# Patient Record
Sex: MALE | Race: WHITE | Employment: FULL TIME | URBAN - METROPOLITAN AREA
[De-identification: names, ages, dates, MRNs, and addresses within clinical notes are randomized per-mention and may not be internally consistent; named-entity substitution may affect disease eponyms.]

---

## 2020-10-15 ENCOUNTER — HOSPITAL ENCOUNTER (OUTPATIENT)
Dept: RADIOLOGY | Facility: HOSPITAL | Age: 45
Discharge: HOME | End: 2020-10-15
Attending: INTERNAL MEDICINE
Payer: COMMERCIAL

## 2022-09-20 ENCOUNTER — TELEPHONE (OUTPATIENT)
Dept: FAMILY MEDICINE | Facility: CLINIC | Age: 47
End: 2022-09-20
Payer: COMMERCIAL

## 2022-09-20 NOTE — TELEPHONE ENCOUNTER
----- Message from Hector Vazquez sent at 9/20/2022  1:35 PM CDT -----  Regarding: pt called  Name of Who is Calling: BREANNA MAZARIEGOS [[id number]]      What is the request in detail: pt called requestin appt for hisself and his wife      Can the clinic reply by [facility]: no      What Number to Call Back if not in [facility]:[phone] (home)

## 2022-09-20 NOTE — TELEPHONE ENCOUNTER
Called and spoke with pt wife. Wife informed that she has already scheduled pt an appt and no longer needs an appt.

## 2022-10-10 ENCOUNTER — OFFICE VISIT (OUTPATIENT)
Dept: FAMILY MEDICINE | Facility: CLINIC | Age: 47
End: 2022-10-10
Payer: COMMERCIAL

## 2022-10-10 VITALS
BODY MASS INDEX: 43.64 KG/M2 | HEIGHT: 69 IN | SYSTOLIC BLOOD PRESSURE: 132 MMHG | HEART RATE: 93 BPM | DIASTOLIC BLOOD PRESSURE: 72 MMHG | OXYGEN SATURATION: 96 % | TEMPERATURE: 98 F | WEIGHT: 294.63 LBS

## 2022-10-10 DIAGNOSIS — Z12.11 SPECIAL SCREENING FOR MALIGNANT NEOPLASMS, COLON: ICD-10-CM

## 2022-10-10 DIAGNOSIS — Z76.89 ESTABLISHING CARE WITH NEW DOCTOR, ENCOUNTER FOR: Primary | ICD-10-CM

## 2022-10-10 DIAGNOSIS — I89.0 LYMPHEDEMA: ICD-10-CM

## 2022-10-10 DIAGNOSIS — R73.01 IFG (IMPAIRED FASTING GLUCOSE): ICD-10-CM

## 2022-10-10 DIAGNOSIS — E29.1 HYPOGONADISM IN MALE: ICD-10-CM

## 2022-10-10 DIAGNOSIS — M79.7 FIBROMYALGIA: Primary | ICD-10-CM

## 2022-10-10 DIAGNOSIS — I10 ESSENTIAL HYPERTENSION: ICD-10-CM

## 2022-10-10 DIAGNOSIS — Z23 NEED FOR INFLUENZA VACCINATION: ICD-10-CM

## 2022-10-10 DIAGNOSIS — G56.00 CARPAL TUNNEL SYNDROME, UNSPECIFIED LATERALITY: ICD-10-CM

## 2022-10-10 DIAGNOSIS — F50.81 BINGE EATING DISORDER: ICD-10-CM

## 2022-10-10 DIAGNOSIS — J30.9 ALLERGIC SINUSITIS: ICD-10-CM

## 2022-10-10 DIAGNOSIS — M79.7 FIBROMYALGIA: ICD-10-CM

## 2022-10-10 DIAGNOSIS — Z12.5 PROSTATE CANCER SCREENING: ICD-10-CM

## 2022-10-10 PROCEDURE — 1159F PR MEDICATION LIST DOCUMENTED IN MEDICAL RECORD: ICD-10-PCS | Mod: CPTII,S$GLB,, | Performed by: PHYSICIAN ASSISTANT

## 2022-10-10 PROCEDURE — 3075F SYST BP GE 130 - 139MM HG: CPT | Mod: CPTII,S$GLB,, | Performed by: PHYSICIAN ASSISTANT

## 2022-10-10 PROCEDURE — 3008F BODY MASS INDEX DOCD: CPT | Mod: CPTII,S$GLB,, | Performed by: PHYSICIAN ASSISTANT

## 2022-10-10 PROCEDURE — 1160F PR REVIEW ALL MEDS BY PRESCRIBER/CLIN PHARMACIST DOCUMENTED: ICD-10-PCS | Mod: CPTII,S$GLB,, | Performed by: PHYSICIAN ASSISTANT

## 2022-10-10 PROCEDURE — 4010F ACE/ARB THERAPY RXD/TAKEN: CPT | Mod: CPTII,S$GLB,, | Performed by: PHYSICIAN ASSISTANT

## 2022-10-10 PROCEDURE — 3075F PR MOST RECENT SYSTOLIC BLOOD PRESS GE 130-139MM HG: ICD-10-PCS | Mod: CPTII,S$GLB,, | Performed by: PHYSICIAN ASSISTANT

## 2022-10-10 PROCEDURE — 4010F PR ACE/ARB THEARPY RXD/TAKEN: ICD-10-PCS | Mod: CPTII,S$GLB,, | Performed by: PHYSICIAN ASSISTANT

## 2022-10-10 PROCEDURE — 99205 OFFICE O/P NEW HI 60 MIN: CPT | Mod: S$GLB,,, | Performed by: PHYSICIAN ASSISTANT

## 2022-10-10 PROCEDURE — 3078F DIAST BP <80 MM HG: CPT | Mod: CPTII,S$GLB,, | Performed by: PHYSICIAN ASSISTANT

## 2022-10-10 PROCEDURE — 3078F PR MOST RECENT DIASTOLIC BLOOD PRESSURE < 80 MM HG: ICD-10-PCS | Mod: CPTII,S$GLB,, | Performed by: PHYSICIAN ASSISTANT

## 2022-10-10 PROCEDURE — 99205 PR OFFICE/OUTPT VISIT, NEW, LEVL V, 60-74 MIN: ICD-10-PCS | Mod: S$GLB,,, | Performed by: PHYSICIAN ASSISTANT

## 2022-10-10 PROCEDURE — 3008F PR BODY MASS INDEX (BMI) DOCUMENTED: ICD-10-PCS | Mod: CPTII,S$GLB,, | Performed by: PHYSICIAN ASSISTANT

## 2022-10-10 PROCEDURE — 1159F MED LIST DOCD IN RCRD: CPT | Mod: CPTII,S$GLB,, | Performed by: PHYSICIAN ASSISTANT

## 2022-10-10 PROCEDURE — 1160F RVW MEDS BY RX/DR IN RCRD: CPT | Mod: CPTII,S$GLB,, | Performed by: PHYSICIAN ASSISTANT

## 2022-10-10 RX ORDER — IBUPROFEN 800 MG/1
800 TABLET ORAL 3 TIMES DAILY
Qty: 90 TABLET | Refills: 0 | Status: CANCELLED | OUTPATIENT
Start: 2022-10-10 | End: 2022-11-09

## 2022-10-10 RX ORDER — LORATADINE 10 MG/1
10 TABLET ORAL DAILY
Qty: 90 TABLET | Refills: 1 | Status: SHIPPED | OUTPATIENT
Start: 2022-10-10 | End: 2022-11-09

## 2022-10-10 RX ORDER — LISDEXAMFETAMINE DIMESYLATE 50 MG/1
50 CAPSULE ORAL EVERY MORNING
COMMUNITY
End: 2022-10-10 | Stop reason: SDUPTHER

## 2022-10-10 RX ORDER — HYDROCHLOROTHIAZIDE 12.5 MG/1
12.5 TABLET ORAL DAILY
Qty: 90 TABLET | Refills: 1 | Status: SHIPPED | OUTPATIENT
Start: 2022-10-10 | End: 2023-04-12 | Stop reason: SDUPTHER

## 2022-10-10 RX ORDER — IBUPROFEN 800 MG/1
800 TABLET ORAL 3 TIMES DAILY
COMMUNITY
End: 2022-10-10

## 2022-10-10 RX ORDER — IRBESARTAN 300 MG/1
300 TABLET ORAL NIGHTLY
COMMUNITY
End: 2022-10-10 | Stop reason: SDUPTHER

## 2022-10-10 RX ORDER — PREGABALIN 75 MG/1
75 CAPSULE ORAL 2 TIMES DAILY
Qty: 60 CAPSULE | Refills: 0 | Status: CANCELLED | OUTPATIENT
Start: 2022-10-10 | End: 2022-11-09

## 2022-10-10 RX ORDER — LORATADINE 10 MG/1
10 TABLET ORAL DAILY
COMMUNITY
End: 2022-10-10 | Stop reason: SDUPTHER

## 2022-10-10 RX ORDER — DULOXETIN HYDROCHLORIDE 30 MG/1
30 CAPSULE, DELAYED RELEASE ORAL DAILY
Qty: 90 CAPSULE | Refills: 1 | Status: SHIPPED | OUTPATIENT
Start: 2022-10-10 | End: 2023-04-12 | Stop reason: SDUPTHER

## 2022-10-10 RX ORDER — PREGABALIN 75 MG/1
75 CAPSULE ORAL 2 TIMES DAILY
Qty: 60 CAPSULE | Refills: 0 | Status: SHIPPED | OUTPATIENT
Start: 2022-10-10 | End: 2023-01-10

## 2022-10-10 RX ORDER — CETIRIZINE HYDROCHLORIDE 10 MG/1
10 TABLET ORAL DAILY
Qty: 30 TABLET | Refills: 0 | Status: CANCELLED | OUTPATIENT
Start: 2022-10-10 | End: 2022-11-09

## 2022-10-10 RX ORDER — PREGABALIN 75 MG/1
75 CAPSULE ORAL 2 TIMES DAILY
COMMUNITY
End: 2022-10-10 | Stop reason: SDUPTHER

## 2022-10-10 RX ORDER — FENOFIBRATE 160 MG/1
145 TABLET ORAL DAILY
COMMUNITY
End: 2023-07-27

## 2022-10-10 RX ORDER — IRBESARTAN 300 MG/1
300 TABLET ORAL NIGHTLY
Qty: 90 TABLET | Refills: 1 | Status: SHIPPED | OUTPATIENT
Start: 2022-10-10 | End: 2022-10-28 | Stop reason: SDUPTHER

## 2022-10-10 RX ORDER — LISDEXAMFETAMINE DIMESYLATE 50 MG/1
50 CAPSULE ORAL EVERY MORNING
Qty: 30 CAPSULE | Refills: 0 | Status: SHIPPED | OUTPATIENT
Start: 2022-12-08 | End: 2022-12-07 | Stop reason: CLARIF

## 2022-10-10 RX ORDER — LISDEXAMFETAMINE DIMESYLATE 50 MG/1
50 CAPSULE ORAL EVERY MORNING
Qty: 30 CAPSULE | Refills: 0 | Status: SHIPPED | OUTPATIENT
Start: 2022-10-10 | End: 2022-10-10 | Stop reason: SDUPTHER

## 2022-10-10 RX ORDER — LISDEXAMFETAMINE DIMESYLATE 50 MG/1
50 CAPSULE ORAL EVERY MORNING
Qty: 30 CAPSULE | Refills: 0 | Status: SHIPPED | OUTPATIENT
Start: 2022-11-09 | End: 2022-12-07 | Stop reason: CLARIF

## 2022-10-10 RX ORDER — CELECOXIB 200 MG/1
200 CAPSULE ORAL DAILY
Qty: 90 CAPSULE | Refills: 1 | Status: SHIPPED | OUTPATIENT
Start: 2022-10-10 | End: 2023-04-12 | Stop reason: SDUPTHER

## 2022-10-10 RX ORDER — CETIRIZINE HYDROCHLORIDE 10 MG/1
10 TABLET ORAL DAILY
COMMUNITY
End: 2022-10-10

## 2022-10-10 NOTE — TELEPHONE ENCOUNTER
New patient and we never filled this medication before.  Last visit: 10/10/22  Next visit: 1/10/23  Last dispense: 7/20/22

## 2022-10-10 NOTE — PROGRESS NOTES
SUBJECTIVE:    Patient ID: Flaco Magaña is a 46 y.o. male.    Chief Complaint: Establish Care (No bottles but has med list/ Pt is here to establish care and discuss buring in left leg, weight loss/Decline flu/BG)    Pt is a 46 y.o. male who presents today as a new patient to establish care.  He presents today with his wife, who contributed to the majority of the interview. He is a poor medical historian. He is a previous patient of Deana Browning NP.  His past medical, surgical, social, and family history was reviewed and documented in his chart.  He has a history of binge eating disorder, currently managed on Vyvanse 50 mg q.d..  He does not follow any particular diet and averages 3-4 meals/day.  He currently works as a  at [employer].  He does not participate in any daily exercise and the majority of his calorie expenditure is via work. He is a former smoker, averaging 1 ppd for 21 years.  He rarely drinks alcohol and denies any recreational drug use.  He follows up with Dr. Lino (Pain Management) regarding his chronic neck pain s/p DORENE.  He currently manages his pain with p.r.n. Ibuprofen 800 mg, but states it is not as efficacious as in the past.  He is requesting an alternative medication today - states he attempted Mobic in the past and it was not efficacious.  He is scheduled to undergo a right carpal tunnel release with Dr. Filipe Dupree (Ortho in [place], [place]) this coming Friday.  He is requesting a external referral for PT/OT therapy postprocedure.  He has a family history of colon cancer - father diagnosed at age 57.  He had a colonoscopy performed in 09/2016 with Dr. Deluca - [facility] 3-5 years, but never followed up with his repeat.  He denies any GI complaints today.    Today, he presents with multiple complaints - weight gain, lower extremity edema, diffuse joint pains, and a numbness sensation in the proximal left thigh.     Never had PSA level obtained in the past.  Denies any  family history of prostate cancer.  Denies any nocturia or LUTS today.       Patient reports having history of low testosterone and receives Bio-T pellets managed by his previous PCP.  He is requesting a referral to establish with a new provider to receive his pellet placements.    Due for influenza vaccine.    No visits with results within 6 Month(s) from this visit.   Latest known visit with results is:   No results found for any previous visit.       Past Medical History:   Diagnosis Date    Binge eating disorder     Hypertension     Hypertriglyceridemia     Other seasonal allergic rhinitis     Unspecified osteoarthritis, unspecified site      Social History     Socioeconomic History    Marital status:    Occupational History    Occupation: [employer]   Tobacco Use    Smoking status: Former     Packs/day: 1.00     Types: Cigarettes    Smokeless tobacco: Never   Substance and Sexual Activity    Alcohol use: No    Drug use: Not Currently   Social History Narrative    ** Merged History Encounter **          Past Surgical History:   Procedure Laterality Date    COLONOSCOPY N/A 9/2/2016    Procedure: COLONOSCOPY;  Surgeon: Yosi Bueno MD;  Location: [facility];  Service: Endoscopy;  Laterality: N/A;     Family History   Problem Relation Age of Onset    Hypertension Mother     Cancer Father         colon    Hypertension Brother     Gout Brother        Review of patient's allergies indicates:  No Known Allergies    Current Outpatient Medications:     fenofibrate 160 MG Tab, Take 145 mg by mouth once daily., Disp: , Rfl:     celecoxib (CELEBREX) 200 MG capsule, Take 1 capsule (200 mg total) by mouth once daily., Disp: 90 capsule, Rfl: 1    DULoxetine (CYMBALTA) 30 MG capsule, Take 1 capsule (30 mg total) by mouth once daily., Disp: 90 capsule, Rfl: 1    hydroCHLOROthiazide (HYDRODIURIL) 12.5 MG Tab, Take 1 tablet (12.5 mg total) by mouth once daily., Disp: 90 tablet, Rfl: 1    irbesartan (AVAPRO) 300 MG  "tablet, Take 1 tablet (300 mg total) by mouth every evening., Disp: 90 tablet, Rfl: 1    [START ON 12/8/2022] lisdexamfetamine (VYVANSE) 50 MG capsule, Take 1 capsule (50 mg total) by mouth every morning., Disp: 30 capsule, Rfl: 0    [START ON 11/9/2022] lisdexamfetamine (VYVANSE) 50 MG capsule, Take 1 capsule (50 mg total) by mouth every morning., Disp: 30 capsule, Rfl: 0    loratadine (CLARITIN) 10 mg tablet, Take 1 tablet (10 mg total) by mouth once daily., Disp: 90 tablet, Rfl: 1    pregabalin (LYRICA) 75 MG capsule, Take 1 capsule (75 mg total) by mouth 2 (two) times daily., Disp: 60 capsule, Rfl: 0    Review of Systems   Constitutional:  Positive for unexpected weight change. Negative for activity change, appetite change, chills, fatigue and fever.   Respiratory:  Negative for cough, shortness of breath and wheezing.    Cardiovascular:  Positive for leg swelling. Negative for chest pain and palpitations.   Gastrointestinal:  Negative for abdominal pain, constipation, diarrhea, nausea and vomiting.   Genitourinary:  Negative for decreased urine volume, difficulty urinating, dysuria, frequency, hematuria and urgency.   Musculoskeletal:  Positive for arthralgias ("Neck, shoulders, lower back, hips, knees, all over."), back pain and neck pain. Negative for gait problem, myalgias and neck stiffness.   Skin:  Negative for rash.   Neurological:  Positive for numbness ("Only on the top of the left thigh."). Negative for dizziness, syncope, weakness, light-headedness and headaches.   Psychiatric/Behavioral:  Negative for behavioral problems.         Objective:      Vitals:    10/10/22 1402   BP: 132/72   Pulse: 93   Temp: 98.1 °F (36.7 °C)   TempSrc: Oral   SpO2: 96%   Weight: 133.6 kg (294 lb 9.6 oz)   Height: 5' 9" (1.753 m)     Physical Exam  Vitals and nursing note reviewed.   Constitutional:       General: He is not in acute distress.     Appearance: Normal appearance. He is obese. He is not ill-appearing.      " Comments: Morbidly obese WM sitting erect in an office chair in no acute distress.   HENT:      Head: Normocephalic and atraumatic.      Right Ear: External ear normal.      Left Ear: External ear normal.      Nose: Nose normal. No rhinorrhea.      Mouth/Throat:      Mouth: Mucous membranes are moist.      Pharynx: Oropharynx is clear.   Eyes:      General: No scleral icterus.     Conjunctiva/sclera: Conjunctivae normal.   Cardiovascular:      Rate and Rhythm: Normal rate and regular rhythm.      Pulses: Normal pulses.      Heart sounds: Normal heart sounds. No murmur heard.    No friction rub.   Pulmonary:      Effort: Pulmonary effort is normal.      Breath sounds: Normal breath sounds. No wheezing, rhonchi or rales.   Abdominal:      General: There is no distension.      Palpations: Abdomen is soft. There is no mass.      Tenderness: There is no abdominal tenderness. There is no guarding or rebound.      Comments: Large, protuberant belly noted.  Soft and nontender to palpation.   Musculoskeletal:      Cervical back: Normal range of motion. No rigidity or tenderness.      Right lower leg: Edema (trace) present.      Left lower leg: Edema (trace) present.        Legs:       Comments: 90 degree flexion at the waist.  No bony abnormalities or step-offs noted upon palpation of the lumbar spine.  5/5 strength against resistance noted in the bilateral upper and lower extremities.  5/5  strength noted bilaterally.   Skin:     General: Skin is warm and dry.      Coloration: Skin is not jaundiced.      Findings: No abrasion, bruising or rash.             Comments: Red circles ari the areas of patients subjectively reported pain.   Neurological:      General: No focal deficit present.      Mental Status: He is alert and oriented to person, place, and time. Mental status is at baseline.      Cranial Nerves: No cranial nerve deficit.      Motor: No weakness.      Coordination: Coordination normal.      Gait: Gait  normal.   Psychiatric:         Mood and Affect: Mood normal.         Behavior: Behavior normal. Behavior is cooperative.         Assessment:       1. Establishing care with new doctor, encounter for    2. Need for influenza vaccination    3. Special screening for malignant neoplasms, colon    4. Prostate cancer screening    5. Fibromyalgia    6. BMI 40.0-44.9, adult    7. Lymphedema    8. Binge eating disorder    9. Allergic sinusitis    10. Essential hypertension    11. Carpal tunnel syndrome, unspecified laterality    12. Hypogonadism in male    13. IFG (impaired fasting glucose)         Plan:       Establishing care with new doctor, encounter for  Baseline lab work ordered today and to be completed when patient is fasting.    Need for influenza vaccination  Patient was offered, but declined influenza vaccine today.    Special screening for malignant neoplasms, colon  Amb referral sent to Dr. Barnes today for screening colonoscopy  -     Ambulatory referral/consult to Gastroenterology; Future; Expected date: 10/10/2022    Prostate cancer screening  PSA lab work ordered today.    Fibromyalgia  Based on patient's presentation and physical exam findings, strong suspicion for fibromyalgia.  Start Cymbalta 30mg q.d. - will titrate if pain persist.  Start Celebrex 200mg q.d..  -     DULoxetine (CYMBALTA) 30 MG capsule; Take 1 capsule (30 mg total) by mouth once daily.  Dispense: 90 capsule; Refill: 1  -     celecoxib (CELEBREX) 200 MG capsule; Take 1 capsule (200 mg total) by mouth once daily.  Dispense: 90 capsule; Refill: 1    BMI 40.0-44.9, adult    Lymphedema  Start HCTZ 12.5 mg q.d..  Begin following a low-sodium diet, wearing compression stockings daily, and elevate lower extremities above heart level when stationary.  Repeat BMP in 4 weeks to reassess kidney function and electrolyte status after initiating thiazide diuretic.  -     hydroCHLOROthiazide (HYDRODIURIL) 12.5 MG Tab; Take 1 tablet (12.5 mg total) by  mouth once daily.  Dispense: 90 tablet; Refill: 1    Binge eating disorder  Currently managed on Vyvanse 50 mg q.d. - refills sent today.  Will complete Profile 4 UDS on follow-up visit.  -     Discontinue: lisdexamfetamine (VYVANSE) 50 MG capsule; Take 1 capsule (50 mg total) by mouth every morning.  Dispense: 30 capsule; Refill: 0  -     lisdexamfetamine (VYVANSE) 50 MG capsule; Take 1 capsule (50 mg total) by mouth every morning.  Dispense: 30 capsule; Refill: 0  -     lisdexamfetamine (VYVANSE) 50 MG capsule; Take 1 capsule (50 mg total) by mouth every morning.  Dispense: 30 capsule; Refill: 0    Allergic sinusitis  -     loratadine (CLARITIN) 10 mg tablet; Take 1 tablet (10 mg total) by mouth once daily.  Dispense: 90 tablet; Refill: 1    Essential hypertension  BP stable and well controlled.    Continue as is - refill sent today.  -     irbesartan (AVAPRO) 300 MG tablet; Take 1 tablet (300 mg total) by mouth every evening.  Dispense: 90 tablet; Refill: 1    Carpal tunnel syndrome, unspecified laterality  Scheduled to undergo carpal tunnel release surgery with Dr. Filipe Dupree (ortho) on Friday.   Amb ref for PT/OT sent today for post-op rehab - patient requested external referral to have rehab completed in [place].  -     Ambulatory referral/consult to Physical/Occupational Therapy; Future; Expected date: 10/10/2022    Hypogonadism in male  Amb ref sent to Dr. Diaz (GYN) today to establish care and have his Bio-T pellet management.    Follow up in about 3 months (around 1/10/2023).        10/11/2022 Cuauhtemoc Murphy PA-C

## 2022-12-01 DIAGNOSIS — F50.81 BINGE EATING DISORDER: ICD-10-CM

## 2022-12-01 RX ORDER — LISDEXAMFETAMINE DIMESYLATE 50 MG/1
50 CAPSULE ORAL EVERY MORNING
Qty: 30 CAPSULE | Refills: 0 | Status: CANCELLED | OUTPATIENT
Start: 2022-12-01 | End: 2022-12-31

## 2022-12-01 NOTE — TELEPHONE ENCOUNTER
Pt is needing a refill on his Vyvanse. Last office visit 10/10/2022. Next office visit 01/10/2023. Chart shows last dispense 11/09/2022. No UDS on file.     Spoke with pt's spouse Aneta  in regards to medication refill request. Verbalized that a new prescription for the Vyvanse is at the pharmacy, but can not be filled until 12/08/2022. Aneta acknowledge understanding.

## 2022-12-07 ENCOUNTER — LAB VISIT (OUTPATIENT)
Dept: LAB | Facility: HOSPITAL | Age: 47
End: 2022-12-07
Attending: PHYSICIAN ASSISTANT
Payer: COMMERCIAL

## 2022-12-07 ENCOUNTER — HOSPITAL ENCOUNTER (OUTPATIENT)
Dept: PREADMISSION TESTING | Facility: HOSPITAL | Age: 47
Discharge: HOME | End: 2022-12-07
Attending: INTERNAL MEDICINE
Payer: COMMERCIAL

## 2022-12-07 DIAGNOSIS — R73.01 IMPAIRED FASTING GLUCOSE: ICD-10-CM

## 2022-12-07 DIAGNOSIS — E29.1 3-OXO-5 ALPHA-STEROID DELTA 4-DEHYDROGENASE DEFICIENCY: Primary | ICD-10-CM

## 2022-12-07 DIAGNOSIS — I10 ESSENTIAL HYPERTENSION, MALIGNANT: ICD-10-CM

## 2022-12-07 DIAGNOSIS — Z12.5 SPECIAL SCREENING FOR MALIGNANT NEOPLASM OF PROSTATE: ICD-10-CM

## 2022-12-07 DIAGNOSIS — F50.81 RECURRENT BINGE EATING: ICD-10-CM

## 2022-12-07 DIAGNOSIS — Z01.818 PRE-OP TESTING: ICD-10-CM

## 2022-12-07 DIAGNOSIS — Z01.818 PRE-OP TESTING: Primary | ICD-10-CM

## 2022-12-07 LAB
ALBUMIN SERPL BCP-MCNC: 4.5 G/DL (ref 3.5–5.2)
ALBUMIN/CREAT UR: 2.3 UG/MG (ref 0–30)
ALP SERPL-CCNC: 54 U/L (ref 55–135)
ALT SERPL W/O P-5'-P-CCNC: 45 U/L (ref 10–44)
ANION GAP SERPL CALC-SCNC: 8 MMOL/L (ref 8–16)
AST SERPL-CCNC: 23 U/L (ref 10–40)
BASOPHILS # BLD AUTO: 0.09 K/UL (ref 0–0.2)
BASOPHILS NFR BLD: 1.9 % (ref 0–1.9)
BILIRUB SERPL-MCNC: 1.1 MG/DL (ref 0.1–1)
BILIRUB UR QL STRIP: NEGATIVE
BUN SERPL-MCNC: 17 MG/DL (ref 6–20)
CALCIUM SERPL-MCNC: 9.5 MG/DL (ref 8.7–10.5)
CHLORIDE SERPL-SCNC: 99 MMOL/L (ref 95–110)
CHOLEST SERPL-MCNC: 226 MG/DL (ref 120–199)
CHOLEST/HDLC SERPL: 6.1 {RATIO} (ref 2–5)
CLARITY UR: CLEAR
CO2 SERPL-SCNC: 30 MMOL/L (ref 23–29)
COLOR UR: YELLOW
COMPLEXED PSA SERPL-MCNC: 0.53 NG/ML (ref 0–4)
CREAT SERPL-MCNC: 1.1 MG/DL (ref 0.5–1.4)
CREAT UR-MCNC: 111 MG/DL (ref 23–375)
DIFFERENTIAL METHOD: ABNORMAL
EOSINOPHIL # BLD AUTO: 0.1 K/UL (ref 0–0.5)
EOSINOPHIL NFR BLD: 3 % (ref 0–8)
ERYTHROCYTE [DISTWIDTH] IN BLOOD BY AUTOMATED COUNT: 12.3 % (ref 11.5–14.5)
EST. GFR  (NO RACE VARIABLE): >60 ML/MIN/1.73 M^2
ESTIMATED AVG GLUCOSE: 105 MG/DL (ref 68–131)
GLUCOSE SERPL-MCNC: 94 MG/DL (ref 70–110)
GLUCOSE UR QL STRIP: NEGATIVE
HBA1C MFR BLD: 5.3 % (ref 4.5–6.2)
HCT VFR BLD AUTO: 46.1 % (ref 40–54)
HDLC SERPL-MCNC: 37 MG/DL (ref 40–75)
HDLC SERPL: 16.4 % (ref 20–50)
HGB BLD-MCNC: 16 G/DL (ref 14–18)
HGB UR QL STRIP: NEGATIVE
IMM GRANULOCYTES # BLD AUTO: 0.03 K/UL (ref 0–0.04)
IMM GRANULOCYTES NFR BLD AUTO: 0.6 % (ref 0–0.5)
KETONES UR QL STRIP: NEGATIVE
LDLC SERPL CALC-MCNC: 118.8 MG/DL (ref 63–159)
LEUKOCYTE ESTERASE UR QL STRIP: NEGATIVE
LYMPHOCYTES # BLD AUTO: 1.6 K/UL (ref 1–4.8)
LYMPHOCYTES NFR BLD: 33.7 % (ref 18–48)
MCH RBC QN AUTO: 29.4 PG (ref 27–31)
MCHC RBC AUTO-ENTMCNC: 34.7 G/DL (ref 32–36)
MCV RBC AUTO: 85 FL (ref 82–98)
MICROALBUMIN UR DL<=1MG/L-MCNC: 2.5 UG/ML
MONOCYTES # BLD AUTO: 0.4 K/UL (ref 0.3–1)
MONOCYTES NFR BLD: 7.5 % (ref 4–15)
NEUTROPHILS # BLD AUTO: 2.5 K/UL (ref 1.8–7.7)
NEUTROPHILS NFR BLD: 53.3 % (ref 38–73)
NITRITE UR QL STRIP: NEGATIVE
NONHDLC SERPL-MCNC: 189 MG/DL
NRBC BLD-RTO: 0 /100 WBC
PH UR STRIP: 8 [PH] (ref 5–8)
PLATELET # BLD AUTO: 240 K/UL (ref 150–450)
PMV BLD AUTO: 9.8 FL (ref 9.2–12.9)
POTASSIUM SERPL-SCNC: 4.2 MMOL/L (ref 3.5–5.1)
PROT SERPL-MCNC: 7.1 G/DL (ref 6–8.4)
PROT UR QL STRIP: NEGATIVE
RBC # BLD AUTO: 5.45 M/UL (ref 4.6–6.2)
SODIUM SERPL-SCNC: 137 MMOL/L (ref 136–145)
SP GR UR STRIP: 1.01 (ref 1–1.03)
TRIGL SERPL-MCNC: 351 MG/DL (ref 30–150)
TSH SERPL DL<=0.005 MIU/L-ACNC: 2.34 UIU/ML (ref 0.34–5.6)
URN SPEC COLLECT METH UR: ABNORMAL
UROBILINOGEN UR STRIP-ACNC: ABNORMAL EU/DL
WBC # BLD AUTO: 4.66 K/UL (ref 3.9–12.7)

## 2022-12-07 PROCEDURE — 84153 ASSAY OF PSA TOTAL: CPT | Performed by: PHYSICIAN ASSISTANT

## 2022-12-07 PROCEDURE — 93010 EKG 12-LEAD: ICD-10-PCS | Mod: ,,, | Performed by: INTERNAL MEDICINE

## 2022-12-07 PROCEDURE — 81003 URINALYSIS AUTO W/O SCOPE: CPT | Performed by: PHYSICIAN ASSISTANT

## 2022-12-07 PROCEDURE — 85025 COMPLETE CBC W/AUTO DIFF WBC: CPT | Performed by: PHYSICIAN ASSISTANT

## 2022-12-07 PROCEDURE — 80061 LIPID PANEL: CPT | Performed by: PHYSICIAN ASSISTANT

## 2022-12-07 PROCEDURE — 80053 COMPREHEN METABOLIC PANEL: CPT | Performed by: PHYSICIAN ASSISTANT

## 2022-12-07 PROCEDURE — 83036 HEMOGLOBIN GLYCOSYLATED A1C: CPT | Performed by: PHYSICIAN ASSISTANT

## 2022-12-07 PROCEDURE — 82043 UR ALBUMIN QUANTITATIVE: CPT | Performed by: PHYSICIAN ASSISTANT

## 2022-12-07 PROCEDURE — 82570 ASSAY OF URINE CREATININE: CPT | Performed by: PHYSICIAN ASSISTANT

## 2022-12-07 PROCEDURE — 84403 ASSAY OF TOTAL TESTOSTERONE: CPT | Performed by: PHYSICIAN ASSISTANT

## 2022-12-07 PROCEDURE — 84443 ASSAY THYROID STIM HORMONE: CPT | Performed by: PHYSICIAN ASSISTANT

## 2022-12-07 PROCEDURE — 93010 ELECTROCARDIOGRAM REPORT: CPT | Mod: ,,, | Performed by: INTERNAL MEDICINE

## 2022-12-07 PROCEDURE — 93005 ELECTROCARDIOGRAM TRACING: CPT | Performed by: INTERNAL MEDICINE

## 2022-12-08 LAB — TESTOST SERPL-MCNC: 369 NG/DL (ref 264–916)

## 2022-12-12 ENCOUNTER — TELEPHONE (OUTPATIENT)
Dept: FAMILY MEDICINE | Facility: CLINIC | Age: 47
End: 2022-12-12

## 2022-12-12 DIAGNOSIS — E78.5 HYPERLIPIDEMIA, UNSPECIFIED HYPERLIPIDEMIA TYPE: Primary | ICD-10-CM

## 2022-12-12 RX ORDER — ATORVASTATIN CALCIUM 10 MG/1
10 TABLET, FILM COATED ORAL NIGHTLY
Qty: 90 TABLET | Refills: 1 | Status: SHIPPED | OUTPATIENT
Start: 2022-12-12 | End: 2023-04-12 | Stop reason: SDUPTHER

## 2022-12-12 NOTE — TELEPHONE ENCOUNTER
----- Message from Yassine Urrutia MA sent at 12/12/2022 10:34 AM CST -----  Regarding: returnign call  Pt wife uche calling back to speak with the nurse regarding the pt.  [phone]

## 2022-12-12 NOTE — TELEPHONE ENCOUNTER
----- Message from ALONSO Egan sent at 12/9/2022  2:02 PM CST -----  Please contact patient and inform him that his lab work was reviewed.  No signs of anemia noted.  UA is negative for any signs of infection, spilled protein, glucose, or blood.  Testosterone level is within reference range.  Hemoglobin A1c is excellent at 5.3%.  PSA is excellent at 0.53.  Kidney function is within normal limits.  One liver enzyme is slightly elevated (ALT) - likely due to elevated cholesterol levels/fatty liver.  Cholesterol levels and triglycerides are significantly elevated.  Secondary to this, I recommend starting Lipitor 10 mg q.h.s. and repeating a CMP and lipid panel in 3 months.

## 2022-12-12 NOTE — TELEPHONE ENCOUNTER
Spoke with pt in regards to recent lab results. Verbalized per Cuauhtemoc that his lab work was reviewed.  No signs of anemia noted.  UA is negative for any signs of infection, spilled protein, glucose, or blood.  Testosterone level is within reference range.  Hemoglobin A1c is excellent at 5.3%.  PSA is excellent at 0.53.  Kidney function is within normal limits.  One liver enzyme is slightly elevated (ALT) - likely due to elevated cholesterol levels/fatty liver.  Cholesterol levels and triglycerides are significantly elevated.  Cuauhtemoc recommends starting Lipitor 10 mg q.h.s. and repeating a CMP and lipid panel in 3 months. Pt acknowledge understanding. Pt is will to start the Lipitor.     Reminder set up.

## 2022-12-29 ENCOUNTER — ANESTHESIA EVENT (OUTPATIENT)
Dept: SURGERY | Facility: HOSPITAL | Age: 47
End: 2022-12-29
Payer: COMMERCIAL

## 2022-12-29 ENCOUNTER — ANESTHESIA (OUTPATIENT)
Dept: SURGERY | Facility: HOSPITAL | Age: 47
End: 2022-12-29
Payer: COMMERCIAL

## 2022-12-29 ENCOUNTER — HOSPITAL ENCOUNTER (OUTPATIENT)
Facility: HOSPITAL | Age: 47
Discharge: HOME | End: 2022-12-29
Attending: INTERNAL MEDICINE | Admitting: INTERNAL MEDICINE
Payer: COMMERCIAL

## 2022-12-29 VITALS
RESPIRATION RATE: 21 BRPM | DIASTOLIC BLOOD PRESSURE: 65 MMHG | OXYGEN SATURATION: 96 % | SYSTOLIC BLOOD PRESSURE: 121 MMHG | HEART RATE: 74 BPM

## 2022-12-29 VITALS
HEART RATE: 62 BPM | SYSTOLIC BLOOD PRESSURE: 144 MMHG | HEIGHT: 69 IN | OXYGEN SATURATION: 97 % | WEIGHT: 274 LBS | DIASTOLIC BLOOD PRESSURE: 92 MMHG | BODY MASS INDEX: 40.58 KG/M2 | TEMPERATURE: 98 F | RESPIRATION RATE: 18 BRPM

## 2022-12-29 DIAGNOSIS — Z86.010 HISTORY OF COLON POLYPS: ICD-10-CM

## 2022-12-29 PROCEDURE — D9220A PRA ANESTHESIA: Mod: 33,CRNA,, | Performed by: NURSE ANESTHETIST, CERTIFIED REGISTERED

## 2022-12-29 PROCEDURE — 25000003 PHARM REV CODE 250: Performed by: NURSE ANESTHETIST, CERTIFIED REGISTERED

## 2022-12-29 PROCEDURE — 45385 COLONOSCOPY W/LESION REMOVAL: CPT | Performed by: INTERNAL MEDICINE

## 2022-12-29 PROCEDURE — D9220A PRA ANESTHESIA: ICD-10-PCS | Mod: 33,ANES,, | Performed by: ANESTHESIOLOGY

## 2022-12-29 PROCEDURE — D9220A PRA ANESTHESIA: ICD-10-PCS | Mod: 33,CRNA,, | Performed by: NURSE ANESTHETIST, CERTIFIED REGISTERED

## 2022-12-29 PROCEDURE — 63600175 PHARM REV CODE 636 W HCPCS: Performed by: NURSE ANESTHETIST, CERTIFIED REGISTERED

## 2022-12-29 PROCEDURE — 27201114 HC TRAP (ANY): Performed by: INTERNAL MEDICINE

## 2022-12-29 PROCEDURE — 25000003 PHARM REV CODE 250: Performed by: INTERNAL MEDICINE

## 2022-12-29 PROCEDURE — 37000008 HC ANESTHESIA 1ST 15 MINUTES: Performed by: INTERNAL MEDICINE

## 2022-12-29 PROCEDURE — 27201089 HC SNARE, DISP (ANY): Performed by: INTERNAL MEDICINE

## 2022-12-29 PROCEDURE — 37000009 HC ANESTHESIA EA ADD 15 MINS: Performed by: INTERNAL MEDICINE

## 2022-12-29 PROCEDURE — D9220A PRA ANESTHESIA: Mod: 33,ANES,, | Performed by: ANESTHESIOLOGY

## 2022-12-29 RX ORDER — PROPOFOL 10 MG/ML
VIAL (ML) INTRAVENOUS
Status: DISCONTINUED | OUTPATIENT
Start: 2022-12-29 | End: 2022-12-29

## 2022-12-29 RX ORDER — PROPOFOL 10 MG/ML
VIAL (ML) INTRAVENOUS CONTINUOUS PRN
Status: DISCONTINUED | OUTPATIENT
Start: 2022-12-29 | End: 2022-12-29

## 2022-12-29 RX ORDER — LIDOCAINE HYDROCHLORIDE 10 MG/ML
INJECTION INFILTRATION; PERINEURAL
Status: DISCONTINUED | OUTPATIENT
Start: 2022-12-29 | End: 2022-12-29

## 2022-12-29 RX ADMIN — LIDOCAINE HYDROCHLORIDE 80 MG: 10 INJECTION, SOLUTION INFILTRATION; PERINEURAL at 08:12

## 2022-12-29 RX ADMIN — PROPOFOL 50 MG: 10 INJECTION, EMULSION INTRAVENOUS at 08:12

## 2022-12-29 RX ADMIN — PROPOFOL 100 MCG/KG/MIN: 10 INJECTION, EMULSION INTRAVENOUS at 08:12

## 2022-12-29 RX ADMIN — PROPOFOL 30 MG: 10 INJECTION, EMULSION INTRAVENOUS at 08:12

## 2022-12-29 RX ADMIN — SODIUM CHLORIDE, SODIUM LACTATE, POTASSIUM CHLORIDE, AND CALCIUM CHLORIDE: .6; .31; .03; .02 INJECTION, SOLUTION INTRAVENOUS at 08:12

## 2022-12-29 RX ADMIN — PROPOFOL 100 MG: 10 INJECTION, EMULSION INTRAVENOUS at 08:12

## 2022-12-29 NOTE — ANESTHESIA POSTPROCEDURE EVALUATION
Anesthesia Post Evaluation    Patient: Flaco Magaña    Procedure(s) Performed: Procedure(s) (LRB):  COLONOSCOPY (N/A)    Final Anesthesia Type: MAC      Patient location during evaluation: GI PACU  Patient participation: Yes- Able to Participate  Level of consciousness: awake and alert  Post-procedure vital signs: reviewed and stable  Pain management: adequate  Airway patency: patent    PONV status at discharge: No PONV  Anesthetic complications: no      Cardiovascular status: stable  Respiratory status: unassisted  Hydration status: euvolemic  Follow-up not needed.          Vitals Value Taken Time   /92 12/29/22 0910   Temp 36.7 °C (98 °F) 12/29/22 0900   Pulse 67 12/29/22 0912   Resp 18 12/29/22 0900   SpO2 95 % 12/29/22 0912   Vitals shown include unvalidated device data.      No case tracking events are documented in the log.      Pain/Sam Score: No data recorded

## 2022-12-29 NOTE — [employer] PATIENT INSTRUCTIONS
Discharge Summary/Instructions after an Endoscopic Procedure  Patient Name: Flaco Magaña  Patient MRN: [id number]  Patient YOB: 1975 Thursday, December 29, 2022  Titi Ortega MD  RESTRICTIONS:  During your procedure today, you received medications for sedation.  These   medications may affect your judgment, balance and coordination.  Therefore,   for 24 hours, you have the following restrictions:   - DO NOT drive a car, operate machinery, make legal/financial decisions,   sign important papers or drink alcohol.    ACTIVITY:  Today: no heavy lifting, straining or running due to procedural   sedation/anesthesia.  The following day: return to full activity including work.  DIET:  Eat and drink normally unless instructed otherwise.     TREATMENT FOR COMMON SIDE EFFECTS:  - Mild abdominal pain, nausea, belching, bloating or excessive gas:  rest,   eat lightly and use a heating pad.  - Sore Throat: treat with throat lozenges and/or gargle with warm salt   water.  - Because air was used during the procedure, expelling large amounts of air   from your rectum or belching is normal.  - If a bowel prep was taken, you may not have a bowel movement for 1-3 days.    This is normal.  SYMPTOMS TO WATCH FOR AND REPORT TO YOUR PHYSICIAN:  1. Abdominal pain or bloating, other than gas cramps.  2. Chest pain.  3. Back pain.  4. Signs of infection such as: chills or fever occurring within 24 hours   after the procedure.  5. Rectal bleeding, which would show as bright red, maroon, or black stools.   (A tablespoon of blood from the rectum is not serious, especially if   hemorrhoids are present.)  6. Vomiting.  7. Weakness or dizziness.  GO DIRECTLY TO THE NEAREST EMERGENCY ROOM IF YOU HAVE ANY OF THE FOLLOWING:      Difficulty breathing              Chills and/or fever over 101 F   Persistent vomiting and/or vomiting blood   Severe abdominal pain   Severe chest pain   Black, tarry stools   Bleeding- more than one  tablespoon   Any other symptom or condition that you feel may need urgent attention  Your doctor recommends these additional instructions:  If any biopsies were taken, your doctors clinic will contact you in 1 to 2   weeks with any results.  - Patient has a contact number available for emergencies.  The signs and   symptoms of potential delayed complications were discussed with the   patient.  Return to normal activities tomorrow.  Written discharge   instructions were provided to the patient.   - Resume previous diet.   - Continue present medications.   - Await pathology results.   - Repeat colonoscopy in 3 years for surveillance.   - Return to GI clinic to discuss family history at length given father   history of CRC.  - Discharge patient to home (with escort).  For questions, problems or results please call your physician - Titi Ortega MD at Work:  [phone].  [facility], EMERGENCY ROOM PHONE NUMBER: [phone]  IF A COMPLICATION OR EMERGENCY SITUATION ARISES AND YOU ARE UNABLE TO REACH   YOUR PHYSICIAN - GO DIRECTLY TO THE EMERGENCY ROOM.  MD Titi Mendes MD  12/29/2022 8:49:42 AM  This report has been verified and signed electronically.  Dear patient,  As a result of recent federal legislation (The Federal Cures Act), you may   receive lab or pathology results from your procedure in your [facility]   account before your physician is able to contact you. Your physician or   their representative will relay the results to you with their   recommendations at their soonest availability.  Thank you,  [employer]

## 2022-12-29 NOTE — ANESTHESIA PREPROCEDURE EVALUATION
12/29/2022  Flaco Magaña is a 47 y.o., male.      Pre-op Assessment    I have reviewed the Patient Summary Reports.     I have reviewed the Nursing Notes. I have reviewed the NPO Status.   I have reviewed the Medications.     Review of Systems  Anesthesia Hx:  Denies Family Hx of Anesthesia complications.   Denies Personal Hx of Anesthesia complications.   Social:  Former Smoker, No Alcohol Use    Hematology/Oncology:  Hematology Normal   Oncology Normal     EENT/Dental:EENT/Dental Normal   Cardiovascular:   Hypertension hyperlipidemia    Pulmonary:  Pulmonary Normal  Denies Sleep Apnea.    Renal/:  Renal/ Normal     Hepatic/GI:   Occasional Rectal bleeding   Musculoskeletal:   Arthritis   Spine Disorders: ( only occasional neck pain since epidural steroid injection) cervical    Neurological:   Peripheral Neuropathy ( bilateral carpal tunnel syndrome)    Endocrine:  Endocrine Normal  Morbid Obesity / BMI > 40  Psych:   Psychiatric History (Binge eating disorder)          Physical Exam  General: Well nourished, Cooperative, Alert and Oriented    Airway:  Mallampati: III / II  Mouth Opening: Normal  TM Distance: > 6 cm  Tongue: Normal  Neck ROM: Normal ROM    Dental:  Intact    Chest/Lungs:  Clear to auscultation, Normal Respiratory Rate    Heart:  Rate: Normal  Rhythm: Regular Rhythm  Sounds: Normal        Anesthesia Plan  Type of Anesthesia, risks & benefits discussed:    Anesthesia Type: MAC  Intra-op Monitoring Plan: Standard ASA Monitors  Informed Consent: Informed consent signed with the Patient and all parties understand the risks and agree with anesthesia plan.  All questions answered.   ASA Score: 3  Anesthesia Plan Notes: POM mask    Ready For Surgery From Anesthesia Perspective.     .

## 2022-12-29 NOTE — H&P
GASTROENTEROLOGY PRE-PROCEDURE H&P NOTE  Patient Name: Flaco Magaña  Patient MRN: [id number]  Patient : 1975    Service date: 2022    PCP: ALONSO Egan    No chief complaint on file.      HPI: Patient is a 47 y.o. male with PMHx as below here for evaluation of screening    Flaco Magaña is a 46 year old male patient who is seen today for an initial visit. Pt with family history of colon cancer in father, last colon around 10 yrs ago presenting with brbpr.  no change in bowel habits. blood with wiping. no weight loss. no pain.  .     Past Medical History:  Past Medical History:   Diagnosis Date    Binge eating disorder     Digestive disorder     Hypertension     Hypertriglyceridemia     Other seasonal allergic rhinitis     Rectal bleeding     when he wipes -    Unspecified osteoarthritis, unspecified site         Past Surgical History:  Past Surgical History:   Procedure Laterality Date    CARPAL TUNNEL RELEASE Right     COLONOSCOPY N/A 2016    Procedure: COLONOSCOPY;  Surgeon: Yosi Bueno MD;  Location: [facility];  Service: Endoscopy;  Laterality: N/A;    neck injections          Home Medications:  Medications Prior to Admission   Medication Sig Dispense Refill Last Dose    atorvastatin (LIPITOR) 10 MG tablet Take 1 tablet (10 mg total) by mouth every evening. 90 tablet 1     celecoxib (CELEBREX) 200 MG capsule Take 1 capsule (200 mg total) by mouth once daily. 90 capsule 1     DULoxetine (CYMBALTA) 30 MG capsule Take 1 capsule (30 mg total) by mouth once daily. 90 capsule 1     fenofibrate 160 MG Tab Take 145 mg by mouth once daily.       hydroCHLOROthiazide (HYDRODIURIL) 12.5 MG Tab Take 1 tablet (12.5 mg total) by mouth once daily. 90 tablet 1     irbesartan (AVAPRO) 300 MG tablet Take 1 tablet (300 mg total) by mouth every evening. 90 tablet 1     loratadine (CLARITIN) 10 mg tablet Take 1 tablet (10 mg total) by mouth once daily. 90 tablet 1     pregabalin (LYRICA) 75 MG capsule Take 1  "capsule (75 mg total) by mouth 2 (two) times daily. 60 capsule 0        Inpatient Medications:        Review of patient's allergies indicates:  No Known Allergies    Social History:   Social History     Occupational History    Occupation: [employer]   Tobacco Use    Smoking status: Former     Packs/day: 1.00     Types: Cigarettes    Smokeless tobacco: Never   Substance and Sexual Activity    Alcohol use: No    Drug use: Not Currently    Sexual activity: Not on file       Family History:   Family History   Problem Relation Age of Onset    Hypertension Mother     Cancer Father         colon    Hypertension Brother     Gout Brother        Review of Systems:  A 10 point review of systems was performed and was normal, except as mentioned in the HPI, including constitutional, HEENT, heme, lymph, cardiovascular, respiratory, gastrointestinal, genitourinary, neurologic, endocrine, psychiatric and musculoskeletal.      OBJECTIVE:    Physical Exam:  24 Hour Vital Sign Ranges: Temp:  [98.1 °F (36.7 °C)] 98.1 °F (36.7 °C)  Pulse:  [74] 74  Resp:  [18] 18  SpO2:  [98 %] 98 %  BP: (130)/(75) 130/75  Most recent vitals: /75 (BP Location: Left arm, Patient Position: Lying)   Pulse 74   Temp 98.1 °F (36.7 °C) (Oral)   Resp 18   Ht 5' 9" (1.753 m)   Wt 124.3 kg (274 lb)   SpO2 98%   BMI 40.46 kg/m²    GEN: well-developed, well-nourished, awake and alert, non-toxic appearing adult  HEENT: PERRL, sclera anicteric, oral mucosa pink and moist without lesion  NECK: trachea midline; Good ROM  CV: regular rate and rhythm, no murmurs or gallops  RESP: clear to auscultation bilaterally, no wheezes, rhonci or rales  ABD: soft, non-tender, non-distended, normal bowel sounds  EXT: no swelling or edema, 2+ pulses distally  SKIN: no rashes or jaundice  PSYCH: normal affect    Labs:   No results for input(s): WBC, MCV, PLT in the last 72 hours.    Invalid input(s): HGBAU  No results for input(s): NA, K, CL, CO2, BUN, GLU in " the last 72 hours.    Invalid input(s): CREA  No results for input(s): ALB in the last 72 hours.    Invalid input(s): ALKP, SGOT, SGPT, TBIL, DBIL, TPRO  No results for input(s): PT, INR, PTT in the last 72 hours.      IMPRESSION / RECOMMENDATIONS:  Screening  Colon  with interventions as warranted.   RIsks, benefits, alternatives discussed in detail regarding upcoming procedures and sedation. Some of the more common endoscopic complications include but not limited to immediate or delayed perforation, bleeding, infections, pain, inadvertent injury to surrounding tissue / organs and possible need for surgical evaluation. Patient expressed understanding, all questions answered and will proceed with procedure as planned.     Titi Ortega  12/29/2022  8:14 AM

## 2023-01-10 ENCOUNTER — OFFICE VISIT (OUTPATIENT)
Dept: FAMILY MEDICINE | Facility: CLINIC | Age: 48
End: 2023-01-10
Payer: COMMERCIAL

## 2023-01-10 VITALS
SYSTOLIC BLOOD PRESSURE: 118 MMHG | BODY MASS INDEX: 38.08 KG/M2 | WEIGHT: 272 LBS | DIASTOLIC BLOOD PRESSURE: 70 MMHG | TEMPERATURE: 99 F | HEART RATE: 85 BPM | OXYGEN SATURATION: 95 % | HEIGHT: 71 IN

## 2023-01-10 DIAGNOSIS — E29.1 HYPOGONADISM IN MALE: ICD-10-CM

## 2023-01-10 DIAGNOSIS — M79.7 FIBROMYALGIA: ICD-10-CM

## 2023-01-10 DIAGNOSIS — I89.0 LYMPHEDEMA: ICD-10-CM

## 2023-01-10 DIAGNOSIS — I10 ESSENTIAL HYPERTENSION: Primary | ICD-10-CM

## 2023-01-10 DIAGNOSIS — F50.81 BINGE EATING DISORDER: ICD-10-CM

## 2023-01-10 DIAGNOSIS — E78.5 HYPERLIPIDEMIA, UNSPECIFIED HYPERLIPIDEMIA TYPE: ICD-10-CM

## 2023-01-10 DIAGNOSIS — G56.00 CARPAL TUNNEL SYNDROME, UNSPECIFIED LATERALITY: ICD-10-CM

## 2023-01-10 PROCEDURE — 1160F PR REVIEW ALL MEDS BY PRESCRIBER/CLIN PHARMACIST DOCUMENTED: ICD-10-PCS | Mod: CPTII,S$GLB,, | Performed by: PHYSICIAN ASSISTANT

## 2023-01-10 PROCEDURE — 99214 OFFICE O/P EST MOD 30 MIN: CPT | Mod: S$GLB,,, | Performed by: PHYSICIAN ASSISTANT

## 2023-01-10 PROCEDURE — 3078F DIAST BP <80 MM HG: CPT | Mod: CPTII,S$GLB,, | Performed by: PHYSICIAN ASSISTANT

## 2023-01-10 PROCEDURE — 1159F MED LIST DOCD IN RCRD: CPT | Mod: CPTII,S$GLB,, | Performed by: PHYSICIAN ASSISTANT

## 2023-01-10 PROCEDURE — 1159F PR MEDICATION LIST DOCUMENTED IN MEDICAL RECORD: ICD-10-PCS | Mod: CPTII,S$GLB,, | Performed by: PHYSICIAN ASSISTANT

## 2023-01-10 PROCEDURE — 3074F SYST BP LT 130 MM HG: CPT | Mod: CPTII,S$GLB,, | Performed by: PHYSICIAN ASSISTANT

## 2023-01-10 PROCEDURE — 1160F RVW MEDS BY RX/DR IN RCRD: CPT | Mod: CPTII,S$GLB,, | Performed by: PHYSICIAN ASSISTANT

## 2023-01-10 PROCEDURE — 3074F PR MOST RECENT SYSTOLIC BLOOD PRESSURE < 130 MM HG: ICD-10-PCS | Mod: CPTII,S$GLB,, | Performed by: PHYSICIAN ASSISTANT

## 2023-01-10 PROCEDURE — 3008F BODY MASS INDEX DOCD: CPT | Mod: CPTII,S$GLB,, | Performed by: PHYSICIAN ASSISTANT

## 2023-01-10 PROCEDURE — 4010F ACE/ARB THERAPY RXD/TAKEN: CPT | Mod: CPTII,S$GLB,, | Performed by: PHYSICIAN ASSISTANT

## 2023-01-10 PROCEDURE — 3078F PR MOST RECENT DIASTOLIC BLOOD PRESSURE < 80 MM HG: ICD-10-PCS | Mod: CPTII,S$GLB,, | Performed by: PHYSICIAN ASSISTANT

## 2023-01-10 PROCEDURE — 3008F PR BODY MASS INDEX (BMI) DOCUMENTED: ICD-10-PCS | Mod: CPTII,S$GLB,, | Performed by: PHYSICIAN ASSISTANT

## 2023-01-10 PROCEDURE — 4010F PR ACE/ARB THEARPY RXD/TAKEN: ICD-10-PCS | Mod: CPTII,S$GLB,, | Performed by: PHYSICIAN ASSISTANT

## 2023-01-10 PROCEDURE — 99214 PR OFFICE/OUTPT VISIT, EST, LEVL IV, 30-39 MIN: ICD-10-PCS | Mod: S$GLB,,, | Performed by: PHYSICIAN ASSISTANT

## 2023-01-10 RX ORDER — LISDEXAMFETAMINE DIMESYLATE 50 MG/1
50 CAPSULE ORAL EVERY MORNING
Qty: 30 CAPSULE | Refills: 0 | Status: SHIPPED | OUTPATIENT
Start: 2023-01-10 | End: 2023-08-23

## 2023-01-10 RX ORDER — FENOFIBRATE 145 MG/1
145 TABLET, FILM COATED ORAL DAILY
COMMUNITY
Start: 2022-12-16 | End: 2023-05-05 | Stop reason: SDUPTHER

## 2023-01-10 RX ORDER — LISDEXAMFETAMINE DIMESYLATE 50 MG/1
50 CAPSULE ORAL EVERY MORNING
COMMUNITY
Start: 2022-12-12 | End: 2023-01-10 | Stop reason: SDUPTHER

## 2023-01-10 RX ORDER — HYDROCHLOROTHIAZIDE 12.5 MG/1
12.5 CAPSULE ORAL DAILY
COMMUNITY
Start: 2022-11-09 | End: 2023-04-12

## 2023-01-10 RX ORDER — LISDEXAMFETAMINE DIMESYLATE 50 MG/1
50 CAPSULE ORAL DAILY
COMMUNITY
Start: 2022-11-09 | End: 2023-04-12

## 2023-01-10 RX ORDER — SOD SULF/POT CHLORIDE/MAG SULF 1.479 G
1 TABLET ORAL DAILY
COMMUNITY
Start: 2022-11-11 | End: 2023-01-10

## 2023-01-10 RX ORDER — IRBESARTAN 300 MG/1
300 TABLET ORAL DAILY
COMMUNITY
Start: 2022-11-09 | End: 2023-04-12

## 2023-01-10 RX ORDER — FENOFIBRATE 160 MG/1
160 TABLET ORAL DAILY
COMMUNITY
Start: 2022-11-09 | End: 2023-04-12

## 2023-01-10 RX ORDER — LISDEXAMFETAMINE DIMESYLATE 50 MG/1
50 CAPSULE ORAL EVERY MORNING
Qty: 30 CAPSULE | Refills: 0 | Status: SHIPPED | OUTPATIENT
Start: 2023-02-09 | End: 2023-08-23

## 2023-01-10 NOTE — PROGRESS NOTES
SUBJECTIVE:    Patient ID: Flaco Magaña is a 47 y.o. male.    Chief Complaint: Follow-up (No bottles/ 3 mo follow up/ carpal tunnel release surgery 12/13/2022/ upcoming carpal tunnel release left 02/06/2023/ colo done 12/29/2022 w Dr. Ortega/ declines flu vaccine/mp)    Pt is a 47 y.o. male with a PMHx of fibromyalgia (Cymbalta 20mg), lymphedema (HCTZ 12.5 mg), hypogonadism, binge eating disorder (Vyvanse 50 mg), HTN (Avapro 300 mg), HLP (Lipitor 10mg), and s/p right carpal tunnel release who presents today for a 3-month follow-up after establishing care in October 2022.  Overall, he reports feeling well and is without complaints.  He tolerated his right carpal tunnel release with Dr. Nahed Hudson (orthopedics) without difficulty and reports a significant improvement in his carpal tunnel pain.  He denies any decreased  strength or paresthesia of the right hand.  He is scheduled to complete a left carpal tunnel release with Dr. Hudson on 02/06/2022.  He reports a 22 lb weight loss since starting Vyvanse 50 mg q.d. for his binge eating disorder.  He is currently averaging only 2 meals/day and denies any adverse side effects such as increased anxiety, depression, agitation, CP, or palpitations.  BP is stable and well controlled in office today.    Dr. Diaz (GYN) - manages his hypogonadism w/ Bio-T pellets.  Testosterone (12/07/2022) within reference range at 369.    UTD: C-scope (12/29/22) - Dr. Ortega - RTC 3 years.    Lab work is UTD.    Lab Visit on 12/07/2022   Component Date Value Ref Range Status    Sodium 12/07/2022 137  136 - 145 mmol/L Final    Potassium 12/07/2022 4.2  3.5 - 5.1 mmol/L Final    Chloride 12/07/2022 99  95 - 110 mmol/L Final    CO2 12/07/2022 30 (H)  23 - 29 mmol/L Final    Glucose 12/07/2022 94  70 - 110 mg/dL Final    BUN 12/07/2022 17  6 - 20 mg/dL Final    Creatinine 12/07/2022 1.1  0.5 - 1.4 mg/dL Final    Calcium 12/07/2022 9.5  8.7 - 10.5 mg/dL Final    Total Protein 12/07/2022 7.1   6.0 - 8.4 g/dL Final    Albumin 12/07/2022 4.5  3.5 - 5.2 g/dL Final    Total Bilirubin 12/07/2022 1.1 (H)  0.1 - 1.0 mg/dL Final    Alkaline Phosphatase 12/07/2022 54 (L)  55 - 135 U/L Final    AST 12/07/2022 23  10 - 40 U/L Final    ALT 12/07/2022 45 (H)  10 - 44 U/L Final    Anion Gap 12/07/2022 8  8 - 16 mmol/L Final    eGFR 12/07/2022 >60.0  >60 mL/min/1.73 m^2 Final    Cholesterol 12/07/2022 226 (H)  120 - 199 mg/dL Final    Triglycerides 12/07/2022 351 (H)  30 - 150 mg/dL Final    HDL 12/07/2022 37 (L)  40 - 75 mg/dL Final    LDL Cholesterol 12/07/2022 118.8  63.0 - 159.0 mg/dL Final    HDL/Cholesterol Ratio 12/07/2022 16.4 (L)  20.0 - 50.0 % Final    Total Cholesterol/HDL Ratio 12/07/2022 6.1 (H)  2.0 - 5.0 Final    Non-HDL Cholesterol 12/07/2022 189  mg/dL Final    Hemoglobin A1C 12/07/2022 5.3  4.5 - 6.2 % Final    Estimated Avg Glucose 12/07/2022 105  68 - 131 mg/dL Final    WBC 12/07/2022 4.66  3.90 - 12.70 K/uL Final    RBC 12/07/2022 5.45  4.60 - 6.20 M/uL Final    Hemoglobin 12/07/2022 16.0  14.0 - 18.0 g/dL Final    Hematocrit 12/07/2022 46.1  40.0 - 54.0 % Final    MCV 12/07/2022 85  82 - 98 fL Final    MCH 12/07/2022 29.4  27.0 - 31.0 pg Final    MCHC 12/07/2022 34.7  32.0 - 36.0 g/dL Final    RDW 12/07/2022 12.3  11.5 - 14.5 % Final    Platelets 12/07/2022 240  150 - 450 K/uL Final    MPV 12/07/2022 9.8  9.2 - 12.9 fL Final    Immature Granulocytes 12/07/2022 0.6 (H)  0.0 - 0.5 % Final    Gran # (ANC) 12/07/2022 2.5  1.8 - 7.7 K/uL Final    Immature Grans (Abs) 12/07/2022 0.03  0.00 - 0.04 K/uL Final    Lymph # 12/07/2022 1.6  1.0 - 4.8 K/uL Final    Mono # 12/07/2022 0.4  0.3 - 1.0 K/uL Final    Eos # 12/07/2022 0.1  0.0 - 0.5 K/uL Final    Baso # 12/07/2022 0.09  0.00 - 0.20 K/uL Final    nRBC 12/07/2022 0  0 /100 WBC Final    Gran % 12/07/2022 53.3  38.0 - 73.0 % Final    Lymph % 12/07/2022 33.7  18.0 - 48.0 % Final    Mono % 12/07/2022 7.5  4.0 - 15.0 % Final    Eosinophil % 12/07/2022 3.0   0.0 - 8.0 % Final    Basophil % 12/07/2022 1.9  0.0 - 1.9 % Final    Differential Method 12/07/2022 Automated   Final    Microalbumin, Urine 12/07/2022 2.5  <19.9 ug/mL Final    Creatinine, Urine 12/07/2022 111.0  23.0 - 375.0 mg/dL Final    Microalb/Creat Ratio 12/07/2022 2.3  0.0 - 30.0 ug/mg Final    TSH 12/07/2022 2.340  0.340 - 5.600 uIU/mL Final    Specimen UA 12/07/2022 Urine, Clean Catch   Final    Color, UA 12/07/2022 Yellow  Yellow, Straw, Marj Final    Appearance, UA 12/07/2022 Clear  Clear Final    pH, UA 12/07/2022 8.0  5.0 - 8.0 Final    Specific Gravity, UA 12/07/2022 1.015  1.005 - 1.030 Final    Protein, UA 12/07/2022 Negative  Negative Final    Glucose, UA 12/07/2022 Negative  Negative Final    Ketones, UA 12/07/2022 Negative  Negative Final    Bilirubin (UA) 12/07/2022 Negative  Negative Final    Occult Blood UA 12/07/2022 Negative  Negative Final    Nitrite, UA 12/07/2022 Negative  Negative Final    Urobilinogen, UA 12/07/2022 2.0-3.0 (A)  Negative EU/dL Final    Leukocytes, UA 12/07/2022 Negative  Negative Final    PSA, Screen 12/07/2022 0.53  0.00 - 4.00 ng/mL Final    Testosterone 12/07/2022 369  264 - 916 ng/dL Final       Past Medical History:   Diagnosis Date    Binge eating disorder     Digestive disorder     Hypertension     Hypertriglyceridemia     Other seasonal allergic rhinitis     Rectal bleeding     when he wipes -    Unspecified osteoarthritis, unspecified site      Past Surgical History:   Procedure Laterality Date    CARPAL TUNNEL RELEASE Right     COLONOSCOPY N/A 09/02/2016    Procedure: COLONOSCOPY;  Surgeon: Yosi Bueno MD;  Location: [facility];  Service: Endoscopy;  Laterality: N/A;    COLONOSCOPY N/A 12/29/2022    Procedure: COLONOSCOPY;  Surgeon: Titi Ortega MD;  Location: [facility];  Service: Endoscopy;  Laterality: N/A;    neck injections       Family History   Problem Relation Age of Onset    Hypertension Mother     Cancer Father         colon    Hypertension  Brother     Gout Brother        Marital Status:   Alcohol History:  reports no history of alcohol use.  Tobacco History:  reports that he has quit smoking. His smoking use included cigarettes. He smoked an average of 1 pack per day. He has never used smokeless tobacco.  Drug History:  reports that he does not currently use drugs.    Health Maintenance Topics with due status: Not Due       Topic Last Completion Date    Hemoglobin A1c (Prediabetes) 12/07/2022    Lipid Panel 12/07/2022    Colorectal Cancer Screening 12/29/2022       There is no immunization history on file for this patient.    Review of patient's allergies indicates:  No Known Allergies    Current Outpatient Medications:     atorvastatin (LIPITOR) 10 MG tablet, Take 1 tablet (10 mg total) by mouth every evening., Disp: 90 tablet, Rfl: 1    celecoxib (CELEBREX) 200 MG capsule, Take 1 capsule (200 mg total) by mouth once daily., Disp: 90 capsule, Rfl: 1    DULoxetine (CYMBALTA) 30 MG capsule, Take 1 capsule (30 mg total) by mouth once daily., Disp: 90 capsule, Rfl: 1    fenofibrate 160 MG Tab, Take 160 mg by mouth once daily., Disp: , Rfl:     hydroCHLOROthiazide (MICROZIDE) 12.5 mg capsule, Take 12.5 mg by mouth once daily., Disp: , Rfl:     irbesartan (AVAPRO) 300 MG tablet, Take 300 mg by mouth once daily., Disp: , Rfl:     lisdexamfetamine (VYVANSE) 50 MG capsule, Take 50 mg by mouth once daily., Disp: , Rfl:     loratadine 10 mg Cap, Take 10 mg by mouth once daily., Disp: , Rfl:     fenofibrate (TRICOR) 145 MG tablet, Take 145 mg by mouth once daily., Disp: , Rfl:     fenofibrate 160 MG Tab, Take 145 mg by mouth once daily., Disp: , Rfl:     hydroCHLOROthiazide (HYDRODIURIL) 12.5 MG Tab, Take 1 tablet (12.5 mg total) by mouth once daily., Disp: 90 tablet, Rfl: 1    VYVANSE 50 mg capsule, Take 1 capsule (50 mg total) by mouth every morning., Disp: 30 capsule, Rfl: 0    [START ON 2/9/2023] VYVANSE 50 mg capsule, Take 1 capsule (50 mg total) by  "mouth every morning., Disp: 30 capsule, Rfl: 0    Review of Systems   Constitutional:  Negative for chills, fatigue and fever.   HENT:  Negative for congestion, ear discharge, ear pain and sore throat.    Respiratory:  Negative for cough, shortness of breath and wheezing.    Cardiovascular:  Negative for chest pain, palpitations and leg swelling.   Gastrointestinal:  Negative for abdominal pain, constipation, diarrhea, nausea and vomiting.   Genitourinary: Negative.    Musculoskeletal:  Negative for arthralgias and myalgias.   Skin:  Negative for rash.   Neurological:  Negative for dizziness, syncope, light-headedness and headaches.   Psychiatric/Behavioral:  Negative for agitation, behavioral problems, decreased concentration and dysphoric mood. The patient is not nervous/anxious and is not hyperactive.         Objective:      Vitals:    01/10/23 1510   BP: 118/70   Pulse: 85   Temp: 98.6 °F (37 °C)   SpO2: 95%   Weight: 123.4 kg (272 lb)   Height: 5' 11" (1.803 m)     Physical Exam  Vitals and nursing note reviewed.   Constitutional:       General: He is not in acute distress.     Appearance: Normal appearance. He is obese. He is not ill-appearing or toxic-appearing.   HENT:      Head: Normocephalic and atraumatic.      Right Ear: Tympanic membrane, ear canal and external ear normal. There is no impacted cerumen.      Left Ear: Tympanic membrane, ear canal and external ear normal.      Nose: No rhinorrhea.      Mouth/Throat:      Mouth: Mucous membranes are moist.      Pharynx: Oropharynx is clear.   Eyes:      General: No scleral icterus.     Conjunctiva/sclera: Conjunctivae normal.   Neck:      Vascular: No carotid bruit.   Cardiovascular:      Rate and Rhythm: Normal rate and regular rhythm.      Pulses: Normal pulses.      Heart sounds: Normal heart sounds. No murmur heard.    No gallop.   Pulmonary:      Effort: Pulmonary effort is normal.      Breath sounds: Normal breath sounds. No wheezing, rhonchi or " rales.   Abdominal:      Palpations: Abdomen is soft.      Tenderness: There is no abdominal tenderness. There is no guarding.   Musculoskeletal:        Hands:       Cervical back: Normal range of motion. No tenderness.      Right lower leg: No edema.      Left lower leg: No edema.   Skin:     General: Skin is warm and dry.      Coloration: Skin is not jaundiced or pale.   Neurological:      General: No focal deficit present.      Mental Status: He is alert. Mental status is at baseline.      Cranial Nerves: No cranial nerve deficit.      Gait: Gait normal.   Psychiatric:         Mood and Affect: Mood normal.         Behavior: Behavior normal. Behavior is cooperative.         Thought Content: Thought content normal.         Judgment: Judgment normal.         Assessment:       1. Essential hypertension    2. Binge eating disorder    3. Hyperlipidemia, unspecified hyperlipidemia type    4. Lymphedema    5. Carpal tunnel syndrome, unspecified laterality    6. Hypogonadism in male    7. Fibromyalgia           Plan:       Essential hypertension  Stable and well controlled.  Continue current treatment regimen as is.    Binge eating disorder  22 lb weight loss since starting Vyvanse 50 mg q.d..    Continue current treatment regimen as is.  Refills sent today.  -     VYVANSE 50 mg capsule; Take 1 capsule (50 mg total) by mouth every morning.  Dispense: 30 capsule; Refill: 0  -     VYVANSE 50 mg capsule; Take 1 capsule (50 mg total) by mouth every morning.  Dispense: 30 capsule; Refill: 0    Hyperlipidemia, unspecified hyperlipidemia type  Tot Chol: 226, LDL: 118, HDL: 37, Tri  Started Lipitor 10 mg q.d., in conjunction Fenofibrate 160 mg q.d. in 2022.    Repeat CMP and lipid panel ordered and is due to be completed 2023.    Lymphedema    Carpal tunnel syndrome, unspecified laterality  Continue following up with Dr. Nahed Hudson (Ortho) in [place], [place] as scheduled.     Hypogonadism in  male    Fibromyalgia  Stable and well controlled.  Continue current treatment regimen as is.  No refills requested today.    Follow up in about 6 months (around 7/10/2023) for HTN, With labs prior to visit.        1/10/2023 Cuauhtemoc Murphy PA-C

## 2023-02-27 ENCOUNTER — TELEPHONE (OUTPATIENT)
Dept: FAMILY MEDICINE | Facility: CLINIC | Age: 48
End: 2023-02-27

## 2023-02-27 DIAGNOSIS — E78.5 HYPERLIPIDEMIA, UNSPECIFIED HYPERLIPIDEMIA TYPE: Primary | ICD-10-CM

## 2023-02-27 NOTE — TELEPHONE ENCOUNTER
Pt wife answered the phone. Aneta on Communication consent. Advised time for Repeat labs and to schedule follow up appointment. Voiced understanding about the labs. States she will call back to discuss appt.

## 2023-02-27 NOTE — TELEPHONE ENCOUNTER
----- Message from Nely Alonso LPN sent at 2/27/2023  8:56 AM CST -----  Regarding: FW: Repeat labs    ----- Message -----  From: Sara Proctor MA  Sent: 2/27/2023  12:00 AM CST  To: Alexa Feldman RT  Subject: Repeat labs                                      ----- Message from ALONSO Egan sent at 12/9/2022  2:02 PM CST -----  Please contact patient and inform him that his lab work was reviewed.  No signs of anemia noted.  UA is negative for any signs of infection, spilled protein, glucose, or blood.  Testosterone level is within reference range.  Hemoglobin A1c is excellent at 5.3%.  PSA is excellent at 0.53.  Kidney function is within normal limits.  One liver enzyme is slightly elevated (ALT) - likely due to elevated cholesterol levels/fatty liver.  Cholesterol levels and triglycerides are significantly elevated.  Secondary to this, I recommend starting Lipitor 10 mg q.h.s. and repeating a CMP and lipid panel in 3 months.    Last collected 12/07/2022.

## 2023-04-12 DIAGNOSIS — I10 ESSENTIAL HYPERTENSION: ICD-10-CM

## 2023-04-12 DIAGNOSIS — J30.9 ALLERGIC SINUSITIS: Primary | ICD-10-CM

## 2023-04-12 DIAGNOSIS — M79.7 FIBROMYALGIA: ICD-10-CM

## 2023-04-12 DIAGNOSIS — I89.0 LYMPHEDEMA: ICD-10-CM

## 2023-04-12 DIAGNOSIS — E78.5 HYPERLIPIDEMIA, UNSPECIFIED HYPERLIPIDEMIA TYPE: ICD-10-CM

## 2023-04-12 RX ORDER — LORATADINE 10 MG/1
10 TABLET ORAL DAILY
Qty: 90 TABLET | Refills: 1 | Status: SHIPPED | OUTPATIENT
Start: 2023-04-12 | End: 2023-07-27 | Stop reason: SDUPTHER

## 2023-04-12 RX ORDER — DULOXETIN HYDROCHLORIDE 30 MG/1
30 CAPSULE, DELAYED RELEASE ORAL DAILY
Qty: 90 CAPSULE | Refills: 1 | Status: SHIPPED | OUTPATIENT
Start: 2023-04-12 | End: 2023-07-27 | Stop reason: SDUPTHER

## 2023-04-12 RX ORDER — CELECOXIB 200 MG/1
200 CAPSULE ORAL DAILY
Qty: 90 CAPSULE | Refills: 1 | Status: SHIPPED | OUTPATIENT
Start: 2023-04-12 | End: 2023-07-27 | Stop reason: SDUPTHER

## 2023-04-12 RX ORDER — ATORVASTATIN CALCIUM 10 MG/1
10 TABLET, FILM COATED ORAL NIGHTLY
Qty: 90 TABLET | Refills: 1 | Status: SHIPPED | OUTPATIENT
Start: 2023-04-12 | End: 2023-07-27 | Stop reason: SDUPTHER

## 2023-04-12 RX ORDER — IRBESARTAN 300 MG/1
300 TABLET ORAL NIGHTLY
Qty: 90 TABLET | Refills: 1 | Status: SHIPPED | OUTPATIENT
Start: 2023-04-12 | End: 2023-07-27 | Stop reason: SDUPTHER

## 2023-04-12 RX ORDER — HYDROCHLOROTHIAZIDE 12.5 MG/1
12.5 TABLET ORAL DAILY
Qty: 90 TABLET | Refills: 1 | Status: SHIPPED | OUTPATIENT
Start: 2023-04-12 | End: 2023-07-27 | Stop reason: SDUPTHER

## 2023-04-12 NOTE — TELEPHONE ENCOUNTER
----- Message from Netta Lino sent at 4/12/2023  3:39 PM CDT -----  Pt needs Refills [facility] [place]  Hydrochlorothiazide  Irbesartan  Loratadine  Atorvastatin  Cymbalta  Celebrex    Also needs a appointment for around June to F/U [phone]. With Karan

## 2023-04-12 NOTE — TELEPHONE ENCOUNTER
Spoke to pt wife. Appt scheduled. Some meds . Rx set back up per original sig. Please review. Pharm and ax verified.

## 2023-05-05 RX ORDER — FENOFIBRATE 145 MG/1
145 TABLET, FILM COATED ORAL DAILY
Qty: 30 TABLET | Refills: 1 | Status: SHIPPED | OUTPATIENT
Start: 2023-05-05 | End: 2023-07-14 | Stop reason: SDUPTHER

## 2023-05-05 NOTE — TELEPHONE ENCOUNTER
Please advise. Jatinder eldridge as new pt on 1/10/23  Has upcoming appt with you. If ok can you fill?

## 2023-05-05 NOTE — TELEPHONE ENCOUNTER
----- Message from Christel Perrin sent at 5/5/2023 11:43 AM CDT -----  Patient wife (Aneta) called and stated that one prescription was not refilled and he has only one left so he need to have his fenofibrate 145 mg [facility] in [place]. If any questions please give her a call at [phone]

## 2023-07-14 RX ORDER — FENOFIBRATE 145 MG/1
145 TABLET, FILM COATED ORAL DAILY
Qty: 30 TABLET | Refills: 0 | Status: SHIPPED | OUTPATIENT
Start: 2023-07-14 | End: 2023-07-27 | Stop reason: SDUPTHER

## 2023-07-14 NOTE — TELEPHONE ENCOUNTER
----- Message from Yassine Lucio MA sent at 7/14/2023  9:25 AM CDT -----  Regarding: refill/appt  Pt needs [facility] [place]. Pt also needs an appt. [phone]

## 2023-07-27 ENCOUNTER — OFFICE VISIT (OUTPATIENT)
Dept: FAMILY MEDICINE | Facility: CLINIC | Age: 48
End: 2023-07-27
Payer: COMMERCIAL

## 2023-07-27 VITALS
BODY MASS INDEX: 40.04 KG/M2 | WEIGHT: 286 LBS | DIASTOLIC BLOOD PRESSURE: 82 MMHG | SYSTOLIC BLOOD PRESSURE: 136 MMHG | HEIGHT: 71 IN | HEART RATE: 72 BPM

## 2023-07-27 DIAGNOSIS — F50.81 BINGE EATING DISORDER: ICD-10-CM

## 2023-07-27 DIAGNOSIS — J30.9 ALLERGIC SINUSITIS: ICD-10-CM

## 2023-07-27 DIAGNOSIS — M79.7 FIBROMYALGIA: ICD-10-CM

## 2023-07-27 DIAGNOSIS — I89.0 LYMPHEDEMA: ICD-10-CM

## 2023-07-27 DIAGNOSIS — E78.5 HYPERLIPIDEMIA, UNSPECIFIED HYPERLIPIDEMIA TYPE: ICD-10-CM

## 2023-07-27 DIAGNOSIS — I10 ESSENTIAL HYPERTENSION: ICD-10-CM

## 2023-07-27 PROCEDURE — 3075F SYST BP GE 130 - 139MM HG: CPT | Mod: CPTII,S$GLB,, | Performed by: PHYSICIAN ASSISTANT

## 2023-07-27 PROCEDURE — 99214 PR OFFICE/OUTPT VISIT, EST, LEVL IV, 30-39 MIN: ICD-10-PCS | Mod: S$GLB,,, | Performed by: PHYSICIAN ASSISTANT

## 2023-07-27 PROCEDURE — 3079F PR MOST RECENT DIASTOLIC BLOOD PRESSURE 80-89 MM HG: ICD-10-PCS | Mod: CPTII,S$GLB,, | Performed by: PHYSICIAN ASSISTANT

## 2023-07-27 PROCEDURE — 1159F PR MEDICATION LIST DOCUMENTED IN MEDICAL RECORD: ICD-10-PCS | Mod: CPTII,S$GLB,, | Performed by: PHYSICIAN ASSISTANT

## 2023-07-27 PROCEDURE — 3008F PR BODY MASS INDEX (BMI) DOCUMENTED: ICD-10-PCS | Mod: CPTII,S$GLB,, | Performed by: PHYSICIAN ASSISTANT

## 2023-07-27 PROCEDURE — 3079F DIAST BP 80-89 MM HG: CPT | Mod: CPTII,S$GLB,, | Performed by: PHYSICIAN ASSISTANT

## 2023-07-27 PROCEDURE — 99214 OFFICE O/P EST MOD 30 MIN: CPT | Mod: S$GLB,,, | Performed by: PHYSICIAN ASSISTANT

## 2023-07-27 PROCEDURE — 3075F PR MOST RECENT SYSTOLIC BLOOD PRESS GE 130-139MM HG: ICD-10-PCS | Mod: CPTII,S$GLB,, | Performed by: PHYSICIAN ASSISTANT

## 2023-07-27 PROCEDURE — 3008F BODY MASS INDEX DOCD: CPT | Mod: CPTII,S$GLB,, | Performed by: PHYSICIAN ASSISTANT

## 2023-07-27 PROCEDURE — 4010F PR ACE/ARB THEARPY RXD/TAKEN: ICD-10-PCS | Mod: CPTII,S$GLB,, | Performed by: PHYSICIAN ASSISTANT

## 2023-07-27 PROCEDURE — 4010F ACE/ARB THERAPY RXD/TAKEN: CPT | Mod: CPTII,S$GLB,, | Performed by: PHYSICIAN ASSISTANT

## 2023-07-27 PROCEDURE — 1159F MED LIST DOCD IN RCRD: CPT | Mod: CPTII,S$GLB,, | Performed by: PHYSICIAN ASSISTANT

## 2023-07-27 RX ORDER — CELECOXIB 200 MG/1
200 CAPSULE ORAL DAILY
Qty: 90 CAPSULE | Refills: 1 | Status: SHIPPED | OUTPATIENT
Start: 2023-07-27

## 2023-07-27 RX ORDER — HYDROCHLOROTHIAZIDE 12.5 MG/1
12.5 TABLET ORAL DAILY
Qty: 90 TABLET | Refills: 1 | Status: SHIPPED | OUTPATIENT
Start: 2023-07-27 | End: 2024-01-09 | Stop reason: SDUPTHER

## 2023-07-27 RX ORDER — IRBESARTAN 300 MG/1
300 TABLET ORAL NIGHTLY
Qty: 90 TABLET | Refills: 1 | Status: SHIPPED | OUTPATIENT
Start: 2023-07-27 | End: 2024-01-09 | Stop reason: SDUPTHER

## 2023-07-27 RX ORDER — ATORVASTATIN CALCIUM 10 MG/1
10 TABLET, FILM COATED ORAL NIGHTLY
Qty: 90 TABLET | Refills: 1 | Status: SHIPPED | OUTPATIENT
Start: 2023-07-27 | End: 2024-01-09 | Stop reason: SDUPTHER

## 2023-07-27 RX ORDER — BUPROPION HYDROCHLORIDE 150 MG/1
150 TABLET ORAL DAILY
Qty: 30 TABLET | Refills: 5 | Status: SHIPPED | OUTPATIENT
Start: 2023-07-27 | End: 2024-01-09 | Stop reason: SDUPTHER

## 2023-07-27 RX ORDER — DULOXETIN HYDROCHLORIDE 30 MG/1
30 CAPSULE, DELAYED RELEASE ORAL DAILY
Qty: 90 CAPSULE | Refills: 1 | Status: SHIPPED | OUTPATIENT
Start: 2023-07-27 | End: 2024-01-09 | Stop reason: SDUPTHER

## 2023-07-27 RX ORDER — FENOFIBRATE 145 MG/1
145 TABLET, FILM COATED ORAL DAILY
Qty: 90 TABLET | Refills: 1 | Status: SHIPPED | OUTPATIENT
Start: 2023-07-27 | End: 2023-12-01 | Stop reason: SDUPTHER

## 2023-07-27 RX ORDER — LORATADINE 10 MG/1
10 TABLET ORAL DAILY
Qty: 90 TABLET | Refills: 1 | Status: SHIPPED | OUTPATIENT
Start: 2023-07-27 | End: 2024-07-26

## 2023-07-27 RX ORDER — LISDEXAMFETAMINE DIMESYLATE 40 MG/1
40 CAPSULE ORAL DAILY
Qty: 30 CAPSULE | Refills: 0 | Status: SHIPPED | OUTPATIENT
Start: 2023-07-27 | End: 2023-11-06 | Stop reason: SDUPTHER

## 2023-07-27 NOTE — PROGRESS NOTES
SUBJECTIVE:    Patient ID: Flaco Magaña is a 47 y.o. male.    Chief Complaint: Hypertension (Went over meds verbally// SW)    This is a 47 year old male presenting for 6 month follow up. He reports feeling well overall since prior visit. Patient had carpal tunnel release surgery of both hands in the past year and reports pain has significantly improved. The only concern he has today is his weight. He states that Vyvanse initially was helping suppress appetite but now now. He denies fever, chest pain, or leg swelling.  Sees Dr. Beatty- Pain management. Every 6-9 mos gets DORENE.       No visits with results within 6 Month(s) from this visit.   Latest known visit with results is:   Lab Visit on 12/07/2022   Component Date Value Ref Range Status    Sodium 12/07/2022 137  136 - 145 mmol/L Final    Potassium 12/07/2022 4.2  3.5 - 5.1 mmol/L Final    Chloride 12/07/2022 99  95 - 110 mmol/L Final    CO2 12/07/2022 30 (H)  23 - 29 mmol/L Final    Glucose 12/07/2022 94  70 - 110 mg/dL Final    BUN 12/07/2022 17  6 - 20 mg/dL Final    Creatinine 12/07/2022 1.1  0.5 - 1.4 mg/dL Final    Calcium 12/07/2022 9.5  8.7 - 10.5 mg/dL Final    Total Protein 12/07/2022 7.1  6.0 - 8.4 g/dL Final    Albumin 12/07/2022 4.5  3.5 - 5.2 g/dL Final    Total Bilirubin 12/07/2022 1.1 (H)  0.1 - 1.0 mg/dL Final    Alkaline Phosphatase 12/07/2022 54 (L)  55 - 135 U/L Final    AST 12/07/2022 23  10 - 40 U/L Final    ALT 12/07/2022 45 (H)  10 - 44 U/L Final    Anion Gap 12/07/2022 8  8 - 16 mmol/L Final    eGFR 12/07/2022 >60.0  >60 mL/min/1.73 m^2 Final    Cholesterol 12/07/2022 226 (H)  120 - 199 mg/dL Final    Triglycerides 12/07/2022 351 (H)  30 - 150 mg/dL Final    HDL 12/07/2022 37 (L)  40 - 75 mg/dL Final    LDL Cholesterol 12/07/2022 118.8  63.0 - 159.0 mg/dL Final    HDL/Cholesterol Ratio 12/07/2022 16.4 (L)  20.0 - 50.0 % Final    Total Cholesterol/HDL Ratio 12/07/2022 6.1 (H)  2.0 - 5.0 Final    Non-HDL Cholesterol 12/07/2022 189   mg/dL Final    Hemoglobin A1C 12/07/2022 5.3  4.5 - 6.2 % Final    Estimated Avg Glucose 12/07/2022 105  68 - 131 mg/dL Final    WBC 12/07/2022 4.66  3.90 - 12.70 K/uL Final    RBC 12/07/2022 5.45  4.60 - 6.20 M/uL Final    Hemoglobin 12/07/2022 16.0  14.0 - 18.0 g/dL Final    Hematocrit 12/07/2022 46.1  40.0 - 54.0 % Final    MCV 12/07/2022 85  82 - 98 fL Final    MCH 12/07/2022 29.4  27.0 - 31.0 pg Final    MCHC 12/07/2022 34.7  32.0 - 36.0 g/dL Final    RDW 12/07/2022 12.3  11.5 - 14.5 % Final    Platelets 12/07/2022 240  150 - 450 K/uL Final    MPV 12/07/2022 9.8  9.2 - 12.9 fL Final    Immature Granulocytes 12/07/2022 0.6 (H)  0.0 - 0.5 % Final    Gran # (ANC) 12/07/2022 2.5  1.8 - 7.7 K/uL Final    Immature Grans (Abs) 12/07/2022 0.03  0.00 - 0.04 K/uL Final    Lymph # 12/07/2022 1.6  1.0 - 4.8 K/uL Final    Mono # 12/07/2022 0.4  0.3 - 1.0 K/uL Final    Eos # 12/07/2022 0.1  0.0 - 0.5 K/uL Final    Baso # 12/07/2022 0.09  0.00 - 0.20 K/uL Final    nRBC 12/07/2022 0  0 /100 WBC Final    Gran % 12/07/2022 53.3  38.0 - 73.0 % Final    Lymph % 12/07/2022 33.7  18.0 - 48.0 % Final    Mono % 12/07/2022 7.5  4.0 - 15.0 % Final    Eosinophil % 12/07/2022 3.0  0.0 - 8.0 % Final    Basophil % 12/07/2022 1.9  0.0 - 1.9 % Final    Differential Method 12/07/2022 Automated   Final    Microalbumin, Urine 12/07/2022 2.5  <19.9 ug/mL Final    Creatinine, Urine 12/07/2022 111.0  23.0 - 375.0 mg/dL Final    Microalb/Creat Ratio 12/07/2022 2.3  0.0 - 30.0 ug/mg Final    TSH 12/07/2022 2.340  0.340 - 5.600 uIU/mL Final    Specimen UA 12/07/2022 Urine, Clean Catch   Final    Color, UA 12/07/2022 Yellow  Yellow, Straw, Marj Final    Appearance, UA 12/07/2022 Clear  Clear Final    pH, UA 12/07/2022 8.0  5.0 - 8.0 Final    Specific Gravity, UA 12/07/2022 1.015  1.005 - 1.030 Final    Protein, UA 12/07/2022 Negative  Negative Final    Glucose, UA 12/07/2022 Negative  Negative Final    Ketones, UA 12/07/2022 Negative  Negative Final     Bilirubin (UA) 12/07/2022 Negative  Negative Final    Occult Blood UA 12/07/2022 Negative  Negative Final    Nitrite, UA 12/07/2022 Negative  Negative Final    Urobilinogen, UA 12/07/2022 2.0-3.0 (A)  Negative EU/dL Final    Leukocytes, UA 12/07/2022 Negative  Negative Final    PSA, Screen 12/07/2022 0.53  0.00 - 4.00 ng/mL Final    Testosterone 12/07/2022 369  264 - 916 ng/dL Final       Past Medical History:   Diagnosis Date    Binge eating disorder     Digestive disorder     Hypertension     Hypertriglyceridemia     Other seasonal allergic rhinitis     Rectal bleeding     when he wipes -    Unspecified osteoarthritis, unspecified site      Past Surgical History:   Procedure Laterality Date    CARPAL TUNNEL RELEASE Right     COLONOSCOPY N/A 09/02/2016    Procedure: COLONOSCOPY;  Surgeon: Yosi Bueno MD;  Location: [facility] ENDO;  Service: Endoscopy;  Laterality: N/A;    COLONOSCOPY N/A 12/29/2022    Procedure: COLONOSCOPY;  Surgeon: Titi Ortega MD;  Location: [facility] ENDO;  Service: Endoscopy;  Laterality: N/A;    neck injections       Family History   Problem Relation Age of Onset    Hypertension Mother     Cancer Father         colon    Hypertension Brother     Gout Brother        Marital Status:   Alcohol History:  reports no history of alcohol use.  Tobacco History:  reports that he has quit smoking. His smoking use included cigarettes. He smoked an average of 1 pack per day. He has never used smokeless tobacco.  Drug History:  reports that he does not currently use drugs.    Review of patient's allergies indicates:  No Known Allergies    Current Outpatient Medications:     VYVANSE 50 mg capsule, Take 1 capsule (50 mg total) by mouth every morning., Disp: 30 capsule, Rfl: 0    VYVANSE 50 mg capsule, Take 1 capsule (50 mg total) by mouth every morning., Disp: 30 capsule, Rfl: 0    atorvastatin (LIPITOR) 10 MG tablet, Take 1 tablet (10 mg total) by mouth every evening., Disp: 90 tablet, Rfl: 1     "buPROPion (WELLBUTRIN XL) 150 MG TB24 tablet, Take 1 tablet (150 mg total) by mouth once daily., Disp: 30 tablet, Rfl: 5    celecoxib (CELEBREX) 200 MG capsule, Take 1 capsule (200 mg total) by mouth once daily., Disp: 90 capsule, Rfl: 1    DULoxetine (CYMBALTA) 30 MG capsule, Take 1 capsule (30 mg total) by mouth once daily., Disp: 90 capsule, Rfl: 1    fenofibrate (TRICOR) 145 MG tablet, Take 1 tablet (145 mg total) by mouth once daily., Disp: 90 tablet, Rfl: 1    hydroCHLOROthiazide (HYDRODIURIL) 12.5 MG Tab, Take 1 tablet (12.5 mg total) by mouth once daily., Disp: 90 tablet, Rfl: 1    irbesartan (AVAPRO) 300 MG tablet, Take 1 tablet (300 mg total) by mouth every evening., Disp: 90 tablet, Rfl: 1    lisdexamfetamine (VYVANSE) 40 MG Cap, Take 1 capsule (40 mg total) by mouth once daily., Disp: 30 capsule, Rfl: 0    loratadine (CLARITIN) 10 mg tablet, Take 1 tablet (10 mg total) by mouth once daily., Disp: 90 tablet, Rfl: 1    Review of Systems   Constitutional:  Negative for activity change, fatigue, fever and unexpected weight change.   HENT:  Negative for congestion.    Respiratory:  Negative for apnea, cough, chest tightness and shortness of breath.    Cardiovascular:  Negative for chest pain and palpitations.   Gastrointestinal:  Negative for abdominal distention and abdominal pain.   Genitourinary:  Negative for difficulty urinating and dysuria.   Musculoskeletal:  Negative for arthralgias and back pain.   Neurological:  Negative for dizziness and weakness.        Objective:      Vitals:    07/27/23 1457   BP: 136/82   Pulse: 72   Weight: 129.7 kg (286 lb)   Height: 5' 11" (1.803 m)     Physical Exam  Constitutional:       General: He is not in acute distress.     Appearance: He is well-developed.   HENT:      Head: Normocephalic and atraumatic.   Eyes:      Pupils: Pupils are equal, round, and reactive to light.   Neck:      Thyroid: No thyromegaly.   Cardiovascular:      Rate and Rhythm: Normal rate and " regular rhythm.      Heart sounds: Normal heart sounds.   Pulmonary:      Effort: Pulmonary effort is normal.      Breath sounds: Normal breath sounds.   Abdominal:      General: Bowel sounds are normal. There is no distension.      Palpations: Abdomen is soft.      Tenderness: There is no abdominal tenderness.   Musculoskeletal:         General: Normal range of motion.      Cervical back: Normal range of motion and neck supple.   Skin:     General: Skin is warm and dry.      Findings: No erythema or rash.   Neurological:      Mental Status: He is alert and oriented to person, place, and time.      Cranial Nerves: No cranial nerve deficit.         Assessment:       1. Hyperlipidemia, unspecified hyperlipidemia type    2. Fibromyalgia    3. Lymphedema    4. Essential hypertension    5. Allergic sinusitis    6. Binge eating disorder         Plan:       Hyperlipidemia, unspecified hyperlipidemia type  Comments:  stable, will plan to get labs done for me asap  Orders:  -     atorvastatin (LIPITOR) 10 MG tablet; Take 1 tablet (10 mg total) by mouth every evening.  Dispense: 90 tablet; Refill: 1    Fibromyalgia  Comments:  great improvement wit hthe addition of nsaid, cymbalta  Orders:  -     celecoxib (CELEBREX) 200 MG capsule; Take 1 capsule (200 mg total) by mouth once daily.  Dispense: 90 capsule; Refill: 1  -     DULoxetine (CYMBALTA) 30 MG capsule; Take 1 capsule (30 mg total) by mouth once daily.  Dispense: 90 capsule; Refill: 1    Lymphedema  -     hydroCHLOROthiazide (HYDRODIURIL) 12.5 MG Tab; Take 1 tablet (12.5 mg total) by mouth once daily.  Dispense: 90 tablet; Refill: 1    Essential hypertension  Comments:  BP is at goal. continue as is.  Orders:  -     irbesartan (AVAPRO) 300 MG tablet; Take 1 tablet (300 mg total) by mouth every evening.  Dispense: 90 tablet; Refill: 1    Allergic sinusitis  -     loratadine (CLARITIN) 10 mg tablet; Take 1 tablet (10 mg total) by mouth once daily.  Dispense: 90 tablet;  Refill: 1    Binge eating disorder  Comments:  Has done ok with  vyvanse. efficacy maybe waning somewhat. lowering so that sleep is not as affected.  Orders:  -     lisdexamfetamine (VYVANSE) 40 MG Cap; Take 1 capsule (40 mg total) by mouth once daily.  Dispense: 30 capsule; Refill: 0  -     buPROPion (WELLBUTRIN XL) 150 MG TB24 tablet; Take 1 tablet (150 mg total) by mouth once daily.  Dispense: 30 tablet; Refill: 5    Other orders  -     fenofibrate (TRICOR) 145 MG tablet; Take 1 tablet (145 mg total) by mouth once daily.  Dispense: 90 tablet; Refill: 1      Follow up in about 3 months (around 10/27/2023).        7/27/2023 Nilson Sykes PA-C

## 2023-08-29 ENCOUNTER — TELEPHONE (OUTPATIENT)
Dept: FAMILY MEDICINE | Facility: CLINIC | Age: 48
End: 2023-08-29

## 2023-08-29 NOTE — TELEPHONE ENCOUNTER
Spoke with pt. tried to get more info from pt. Pt states he is at work right now and can not talk. Pt states he will call back.

## 2023-08-29 NOTE — TELEPHONE ENCOUNTER
Wife on communication consent.  Wife states pt c/o burning with urination, frequency and stream is not force full. Offered appt tomorrow. Wife states she will call pt and see if he can take off work.

## 2023-08-29 NOTE — TELEPHONE ENCOUNTER
----- Message from Bryan Judge sent at 8/29/2023 11:33 AM CDT -----  Pt has a UTI and needs to be seen or have labs put in for a urinalysis  [phone]

## 2023-08-29 NOTE — TELEPHONE ENCOUNTER
----- Message from Shavon Chatman sent at 8/29/2023 12:22 PM CDT -----  Contact: Aneta Cornell is returning the office call for Bobby Cornell @[phone]

## 2023-09-11 ENCOUNTER — TELEPHONE (OUTPATIENT)
Dept: FAMILY MEDICINE | Facility: CLINIC | Age: 48
End: 2023-09-11

## 2023-09-11 DIAGNOSIS — N30.00 ACUTE CYSTITIS WITHOUT HEMATURIA: Primary | ICD-10-CM

## 2023-09-11 NOTE — TELEPHONE ENCOUNTER
Spoke with Aneta. Pt has been having a hemorrhoid problem with causes pt to have urinary issues. They are wanting to follow up with urologist. States he went to UC and they gave him antibiotics which have not helped. C/o discomfort/ frequent urination and pressure.

## 2023-09-12 ENCOUNTER — TELEPHONE (OUTPATIENT)
Dept: FAMILY MEDICINE | Facility: CLINIC | Age: 48
End: 2023-09-12
Payer: COMMERCIAL

## 2023-09-22 ENCOUNTER — OFFICE VISIT (OUTPATIENT)
Dept: UROLOGY | Facility: CLINIC | Age: 48
End: 2023-09-22
Payer: COMMERCIAL

## 2023-09-22 VITALS — WEIGHT: 287 LBS | HEIGHT: 71 IN | BODY MASS INDEX: 40.18 KG/M2

## 2023-09-22 DIAGNOSIS — N30.00 ACUTE CYSTITIS WITHOUT HEMATURIA: ICD-10-CM

## 2023-09-22 DIAGNOSIS — N41.0 ACUTE PROSTATITIS: Primary | ICD-10-CM

## 2023-09-22 LAB
BILIRUBIN, UA POC OHS: NEGATIVE
BLOOD, UA POC OHS: NEGATIVE
CLARITY, UA POC OHS: CLEAR
COLOR, UA POC OHS: YELLOW
GLUCOSE, UA POC OHS: NEGATIVE
KETONES, UA POC OHS: NEGATIVE
LEUKOCYTES, UA POC OHS: NEGATIVE
NITRITE, UA POC OHS: NEGATIVE
PH, UA POC OHS: 5.5
PROTEIN, UA POC OHS: NEGATIVE
SPECIFIC GRAVITY, UA POC OHS: >=1.03
UROBILINOGEN, UA POC OHS: 1

## 2023-09-22 PROCEDURE — 1160F RVW MEDS BY RX/DR IN RCRD: CPT | Mod: CPTII,S$GLB,, | Performed by: UROLOGY

## 2023-09-22 PROCEDURE — 99999 PR PBB SHADOW E&M-EST. PATIENT-LVL III: ICD-10-PCS | Mod: PBBFAC,,, | Performed by: UROLOGY

## 2023-09-22 PROCEDURE — 1160F PR REVIEW ALL MEDS BY PRESCRIBER/CLIN PHARMACIST DOCUMENTED: ICD-10-PCS | Mod: CPTII,S$GLB,, | Performed by: UROLOGY

## 2023-09-22 PROCEDURE — 99204 PR OFFICE/OUTPT VISIT, NEW, LEVL IV, 45-59 MIN: ICD-10-PCS | Mod: S$GLB,,, | Performed by: UROLOGY

## 2023-09-22 PROCEDURE — 81003 URINALYSIS AUTO W/O SCOPE: CPT | Mod: QW,S$GLB,, | Performed by: UROLOGY

## 2023-09-22 PROCEDURE — 3008F BODY MASS INDEX DOCD: CPT | Mod: CPTII,S$GLB,, | Performed by: UROLOGY

## 2023-09-22 PROCEDURE — 4010F PR ACE/ARB THEARPY RXD/TAKEN: ICD-10-PCS | Mod: CPTII,S$GLB,, | Performed by: UROLOGY

## 2023-09-22 PROCEDURE — 81003 POCT URINALYSIS(INSTRUMENT): ICD-10-PCS | Mod: QW,S$GLB,, | Performed by: UROLOGY

## 2023-09-22 PROCEDURE — 1159F PR MEDICATION LIST DOCUMENTED IN MEDICAL RECORD: ICD-10-PCS | Mod: CPTII,S$GLB,, | Performed by: UROLOGY

## 2023-09-22 PROCEDURE — 99204 OFFICE O/P NEW MOD 45 MIN: CPT | Mod: S$GLB,,, | Performed by: UROLOGY

## 2023-09-22 PROCEDURE — 4010F ACE/ARB THERAPY RXD/TAKEN: CPT | Mod: CPTII,S$GLB,, | Performed by: UROLOGY

## 2023-09-22 PROCEDURE — 1159F MED LIST DOCD IN RCRD: CPT | Mod: CPTII,S$GLB,, | Performed by: UROLOGY

## 2023-09-22 PROCEDURE — 3008F PR BODY MASS INDEX (BMI) DOCUMENTED: ICD-10-PCS | Mod: CPTII,S$GLB,, | Performed by: UROLOGY

## 2023-09-22 PROCEDURE — 99999 PR PBB SHADOW E&M-EST. PATIENT-LVL III: CPT | Mod: PBBFAC,,, | Performed by: UROLOGY

## 2023-09-22 RX ORDER — DOXYCYCLINE 100 MG/1
100 CAPSULE ORAL 2 TIMES DAILY
Qty: 42 CAPSULE | Refills: 0 | Status: SHIPPED | OUTPATIENT
Start: 2023-09-22 | End: 2023-10-13

## 2023-09-22 NOTE — PROGRESS NOTES
[facility] New Patient/H&P:    Flaco Magaña is a 47 y.o. male who presents for lower urinary tract symptoms.    Patient with a history of HTN who presents with a 2 month history of bothersome lower urinary tract symptoms.     He reports dysuria, weak stream, intermittency and urgency which began after he was diagnosed with an anal fissure. He states he was voiding well prior to his diagnosis.     He reports taking an antibiotic per his wife which improved his symptoms. Unsure of the antibiotic he took. However, he states the symptoms returned after he stopped the antibiotic. Drinks mostly water, Gatorade and tea.     Remains with bothersome lower urinary tract symptoms today. Urine dipstick negative.     Works as a . Smoked 1 ppd for over 20 years. Quit at age 39. Father with a history of colon cancer.     Denies any fever, chills, gross hematuria, flank pain, bone pain, unintentional weight loss,  trauma or history of  malignancy.       Past Medical History:   Diagnosis Date    Binge eating disorder     Digestive disorder     Hypertension     Hypertriglyceridemia     Other seasonal allergic rhinitis     Rectal bleeding     when he wipes -    Unspecified osteoarthritis, unspecified site        Past Surgical History:   Procedure Laterality Date    CARPAL TUNNEL RELEASE Right     COLONOSCOPY N/A 09/02/2016    Procedure: COLONOSCOPY;  Surgeon: Yosi Bueno MD;  Location: [facility] ENDO;  Service: Endoscopy;  Laterality: N/A;    COLONOSCOPY N/A 12/29/2022    Procedure: COLONOSCOPY;  Surgeon: Titi Ortega MD;  Location: [facility] ENDO;  Service: Endoscopy;  Laterality: N/A;    neck injections         Family History   Problem Relation Age of Onset    Hypertension Mother     Cancer Father         colon    Hypertension Brother     Gout Brother        Review of patient's allergies indicates:  No Known Allergies    Medications Reviewed: see MAR      FOCUSED PHYSICAL EXAM:    There  "were no vitals filed for this visit.  Body mass index is 40.03 kg/m². Weight: 130.2 kg (287 lb) Height: 5' 11" (180.3 cm)       General: Alert, cooperative, no distress, appears stated age  Abdomen: Soft, non-tender, no CVA tenderness, non-distended      LABS:    Recent Results (from the past 336 hour(s))   POCT Urinalysis(Instrument)    Collection Time: 09/22/23  3:01 PM   Result Value Ref Range    Color, POC UA Yellow Yellow, Straw, Colorless    Clarity, POC UA Clear Clear    Glucose, POC UA Negative Negative    Bilirubin, POC UA Negative Negative    Ketones, POC UA Negative Negative    Spec Grav POC UA >=1.030 1.005 - 1.030    Blood, POC UA Negative Negative    pH, POC UA 5.5 5.0 - 8.0    Protein, POC UA Negative Negative    Urobilinogen, POC UA 1.0 <=1.0    Nitrite, POC UA Negative Negative    WBC, POC UA Negative Negative           Assessment/Diagnosis:    1. Acute prostatitis        2. Acute cystitis without hematuria  Ambulatory referral/consult to Urology    POCT Urinalysis(Instrument)          Plans:    - I spent 45 minutes of the day of this encounter preparing for, treating and managing this patient. Extensive discussion with patient regarding the etiology and management of his lower urinary tract symptoms. Explained that LUTS are multifactorial and can be secondary to an enlarged prostate, PO intake of bladder irritants, overactive bladder, constipation, malignancy, trauma, infection, stones or medications. We discussed that his symptoms appear consistent with acute prostatitis.   - RX for Doxycycline x 21 days.   - Continue Celecoxib 200 mg PO daily.   - RTC as needed.         "

## 2023-11-06 DIAGNOSIS — F50.81 BINGE EATING DISORDER: ICD-10-CM

## 2023-11-06 RX ORDER — LISDEXAMFETAMINE DIMESYLATE 40 MG/1
40 CAPSULE ORAL DAILY
Qty: 30 CAPSULE | Refills: 0 | Status: SHIPPED | OUTPATIENT
Start: 2023-11-06 | End: 2023-12-01 | Stop reason: SDUPTHER

## 2023-11-06 NOTE — TELEPHONE ENCOUNTER
Spoke to Myla with [facility] and she stated pt has been getting vyvanse and the prescription has  and new one needs to be sent. Pt told the pharmacy to call out office to get him a new prescription.

## 2023-11-06 NOTE — TELEPHONE ENCOUNTER
----- Message from Harmony Menjivar sent at 11/6/2023  2:58 PM CST -----  Vm:    Myla from [employer] left a voicemail to receive a call back    [phone]

## 2023-11-14 ENCOUNTER — TELEPHONE (OUTPATIENT)
Dept: FAMILY MEDICINE | Facility: CLINIC | Age: 48
End: 2023-11-14

## 2023-11-14 DIAGNOSIS — E78.5 HYPERLIPIDEMIA, UNSPECIFIED HYPERLIPIDEMIA TYPE: Primary | ICD-10-CM

## 2023-11-14 DIAGNOSIS — I10 ESSENTIAL HYPERTENSION: ICD-10-CM

## 2023-11-14 NOTE — TELEPHONE ENCOUNTER
Spoke with pt's wife. Appt rescheduled. Informed her that labs are needed prior to upcoming OV. She voiced understanding.

## 2023-11-14 NOTE — TELEPHONE ENCOUNTER
----- Message from Evy Byers MA sent at 11/14/2023 12:37 PM CST -----  [phone] pt 's wife is requesting  a reschedule for above patient she also wants to know if the patient will need to have lab work completed , please contact the above pt with next directive  for sooner date requesting before 01/24

## 2023-12-01 DIAGNOSIS — F50.81 BINGE EATING DISORDER: ICD-10-CM

## 2023-12-01 RX ORDER — FENOFIBRATE 145 MG/1
145 TABLET, FILM COATED ORAL DAILY
Qty: 90 TABLET | Refills: 1 | Status: SHIPPED | OUTPATIENT
Start: 2023-12-01 | End: 2024-01-09 | Stop reason: SDUPTHER

## 2023-12-01 RX ORDER — LISDEXAMFETAMINE DIMESYLATE 40 MG/1
40 CAPSULE ORAL DAILY
Qty: 30 CAPSULE | Refills: 0 | Status: SHIPPED | OUTPATIENT
Start: 2023-12-01 | End: 2024-03-06

## 2023-12-25 ENCOUNTER — PATIENT MESSAGE (OUTPATIENT)
Dept: UROLOGY | Facility: CLINIC | Age: 48
End: 2023-12-25
Payer: COMMERCIAL

## 2023-12-29 ENCOUNTER — CLINICAL SUPPORT (OUTPATIENT)
Dept: UROLOGY | Facility: CLINIC | Age: 48
End: 2023-12-29
Payer: COMMERCIAL

## 2023-12-29 DIAGNOSIS — N30.00 ACUTE CYSTITIS WITHOUT HEMATURIA: Primary | ICD-10-CM

## 2023-12-29 LAB
BILIRUB UR QL STRIP: NEGATIVE
CLARITY UR REFRACT.AUTO: ABNORMAL
COLOR UR AUTO: YELLOW
GLUCOSE UR QL STRIP: NEGATIVE
HGB UR QL STRIP: NEGATIVE
KETONES UR QL STRIP: NEGATIVE
LEUKOCYTE ESTERASE UR QL STRIP: NEGATIVE
NITRITE UR QL STRIP: NEGATIVE
PH UR STRIP: 6 [PH] (ref 5–8)
PROT UR QL STRIP: NEGATIVE
SP GR UR STRIP: 1.01 (ref 1–1.03)
URN SPEC COLLECT METH UR: ABNORMAL

## 2023-12-29 PROCEDURE — 81003 URINALYSIS AUTO W/O SCOPE: CPT | Performed by: UROLOGY

## 2023-12-29 PROCEDURE — 99499 NO LOS: ICD-10-PCS | Mod: S$GLB,,, | Performed by: UROLOGY

## 2023-12-29 PROCEDURE — 99499 UNLISTED E&M SERVICE: CPT | Mod: S$GLB,,, | Performed by: UROLOGY

## 2023-12-29 PROCEDURE — 87086 URINE CULTURE/COLONY COUNT: CPT | Performed by: UROLOGY

## 2023-12-30 LAB — BACTERIA UR CULT: NO GROWTH

## 2024-01-02 ENCOUNTER — TELEPHONE (OUTPATIENT)
Dept: UROLOGY | Facility: CLINIC | Age: 49
End: 2024-01-02
Payer: COMMERCIAL

## 2024-01-02 NOTE — TELEPHONE ENCOUNTER
----- Message from Lizzette Ramos sent at 1/2/2024  1:53 PM CST -----  Contact: Wife  Type:  Test Results    Who Called:  Wife  Name of Test (Lab/Mammo/Etc):  labs  Date of Test:  friday  Ordering Provider:  Chepe  Where the test was performed:  [facility]  Best Call Back Number:  [phone]    Additional Information:  Please call

## 2024-01-02 NOTE — TELEPHONE ENCOUNTER
Spoke with pt wife. Informed her that Dr. Mo have not put anything in yet and that I will forward him the message. Pt wife verbalized understanding.

## 2024-01-03 ENCOUNTER — TELEPHONE (OUTPATIENT)
Dept: UROLOGY | Facility: CLINIC | Age: 49
End: 2024-01-03
Payer: COMMERCIAL

## 2024-01-03 NOTE — TELEPHONE ENCOUNTER
----- Message from Fariha Enrandez sent at 1/3/2024  3:21 PM CST -----  Contact: Aneta  Type:  Test Results    Who Called:  Aneta/ Pt Wife  Name of Test (Lab/Mammo/Etc):  Urine Culture  Date of Test:  12/29/23  Ordering Provider:  Dr. Mo  Where the test was performed:  [facility] urology  Best Call Back Number:  [phone]  Additional Information:  Have made several calls and messages sent has not received a response back

## 2024-01-03 NOTE — TELEPHONE ENCOUNTER
Spoke with pt wife. Informed her that I will forward this message to Dr. Mo. Pt verbalized understanding.

## 2024-01-04 ENCOUNTER — TELEPHONE (OUTPATIENT)
Dept: UROLOGY | Facility: CLINIC | Age: 49
End: 2024-01-04
Payer: COMMERCIAL

## 2024-01-04 NOTE — TELEPHONE ENCOUNTER
Spoke with pt wife. Informed pt wife that Dr Mo first avail is in April. Scheduled pt with rupesh on 1/9. Pt wife verbalized understanding.

## 2024-01-09 ENCOUNTER — OFFICE VISIT (OUTPATIENT)
Dept: FAMILY MEDICINE | Facility: CLINIC | Age: 49
End: 2024-01-09
Payer: COMMERCIAL

## 2024-01-09 ENCOUNTER — PATIENT MESSAGE (OUTPATIENT)
Dept: FAMILY MEDICINE | Facility: CLINIC | Age: 49
End: 2024-01-09

## 2024-01-09 VITALS
HEIGHT: 71 IN | SYSTOLIC BLOOD PRESSURE: 138 MMHG | BODY MASS INDEX: 41.58 KG/M2 | HEART RATE: 86 BPM | WEIGHT: 297 LBS | OXYGEN SATURATION: 98 % | DIASTOLIC BLOOD PRESSURE: 74 MMHG

## 2024-01-09 DIAGNOSIS — I89.0 LYMPHEDEMA: ICD-10-CM

## 2024-01-09 DIAGNOSIS — K61.2 ABSCESS OF ANAL AND RECTAL REGIONS: Primary | ICD-10-CM

## 2024-01-09 DIAGNOSIS — M79.7 FIBROMYALGIA: ICD-10-CM

## 2024-01-09 DIAGNOSIS — F50.81 BINGE EATING DISORDER: ICD-10-CM

## 2024-01-09 DIAGNOSIS — I10 ESSENTIAL HYPERTENSION: ICD-10-CM

## 2024-01-09 DIAGNOSIS — E78.5 HYPERLIPIDEMIA, UNSPECIFIED HYPERLIPIDEMIA TYPE: ICD-10-CM

## 2024-01-09 PROCEDURE — 1159F MED LIST DOCD IN RCRD: CPT | Mod: CPTII,S$GLB,, | Performed by: PHYSICIAN ASSISTANT

## 2024-01-09 PROCEDURE — 3008F BODY MASS INDEX DOCD: CPT | Mod: CPTII,S$GLB,, | Performed by: PHYSICIAN ASSISTANT

## 2024-01-09 PROCEDURE — 99214 OFFICE O/P EST MOD 30 MIN: CPT | Mod: S$GLB,,, | Performed by: PHYSICIAN ASSISTANT

## 2024-01-09 PROCEDURE — 3075F SYST BP GE 130 - 139MM HG: CPT | Mod: CPTII,S$GLB,, | Performed by: PHYSICIAN ASSISTANT

## 2024-01-09 PROCEDURE — 3078F DIAST BP <80 MM HG: CPT | Mod: CPTII,S$GLB,, | Performed by: PHYSICIAN ASSISTANT

## 2024-01-09 RX ORDER — HYDROCHLOROTHIAZIDE 12.5 MG/1
12.5 TABLET ORAL DAILY
Qty: 90 TABLET | Refills: 1 | Status: SHIPPED | OUTPATIENT
Start: 2024-01-09 | End: 2025-01-08

## 2024-01-09 RX ORDER — FENOFIBRATE 145 MG/1
145 TABLET, COATED ORAL DAILY
Qty: 90 TABLET | Refills: 1 | Status: SHIPPED | OUTPATIENT
Start: 2024-01-09

## 2024-01-09 RX ORDER — ATORVASTATIN CALCIUM 10 MG/1
10 TABLET, FILM COATED ORAL NIGHTLY
Qty: 90 TABLET | Refills: 1 | Status: SHIPPED | OUTPATIENT
Start: 2024-01-09 | End: 2025-01-08

## 2024-01-09 RX ORDER — DULOXETIN HYDROCHLORIDE 30 MG/1
30 CAPSULE, DELAYED RELEASE ORAL DAILY
Qty: 90 CAPSULE | Refills: 1 | Status: SHIPPED | OUTPATIENT
Start: 2024-01-09 | End: 2025-01-08

## 2024-01-09 RX ORDER — BUPROPION HYDROCHLORIDE 150 MG/1
150 TABLET ORAL DAILY
Qty: 90 TABLET | Refills: 1 | Status: SHIPPED | OUTPATIENT
Start: 2024-01-09 | End: 2024-07-07

## 2024-01-09 RX ORDER — IRBESARTAN 300 MG/1
300 TABLET ORAL NIGHTLY
Qty: 90 TABLET | Refills: 1 | Status: SHIPPED | OUTPATIENT
Start: 2024-01-09 | End: 2025-01-08

## 2024-01-09 RX ORDER — DOXYCYCLINE 100 MG/1
100 CAPSULE ORAL EVERY 12 HOURS
Qty: 20 CAPSULE | Refills: 0 | Status: SHIPPED | OUTPATIENT
Start: 2024-01-09 | End: 2024-01-19

## 2024-01-09 NOTE — PROGRESS NOTES
"  SUBJECTIVE:    Patient ID: Flaco Magaña is a 48 y.o. male.    Chief Complaint: Follow-up (No bottles//routine follow up-under care of urology now//unsure if refills needed-will ask wife//declines flu)    This is a 48 y.o. male with PMHx of HLD, HTN, fibromyalgia, binge eating, and sinusitis, presenting for wellness exam. Since seeing him in July, he has stopped taking prescribed Vyvanse due to insomnia, and reports approximately 20 lb weight gain. Since stopping vyvanse, sleep has improved. He has been trying to make conscious decisions to eat more throughout the day, as opposed to eating a large quantity of food when he gets home from work. Also been making healthier food choices, like opting for grilled instead of fried foods. He reports having an anal fissure, which progressed to a palpable, draining mass located at the anterior rectum. He occasionally notices blood and "stickiness" on his underwear and sheets. He denies pain with defecation, fever/chills.        Clinical Support on 12/29/2023   Component Date Value Ref Range Status    Specimen UA 12/29/2023 Urine, Clean Catch   Final    Color, UA 12/29/2023 Yellow  Yellow, Straw, Marj Final    Appearance, UA 12/29/2023 Hazy (A)  Clear Final    pH, UA 12/29/2023 6.0  5.0 - 8.0 Final    Specific Gravity, UA 12/29/2023 1.015  1.005 - 1.030 Final    Protein, UA 12/29/2023 Negative  Negative Final    Glucose, UA 12/29/2023 Negative  Negative Final    Ketones, UA 12/29/2023 Negative  Negative Final    Bilirubin (UA) 12/29/2023 Negative  Negative Final    Occult Blood UA 12/29/2023 Negative  Negative Final    Nitrite, UA 12/29/2023 Negative  Negative Final    Leukocytes, UA 12/29/2023 Negative  Negative Final    Urine Culture, Routine 12/29/2023 No growth   Final   Office Visit on 09/22/2023   Component Date Value Ref Range Status    Color, POC UA 09/22/2023 Yellow  Yellow, Straw, Colorless Final    Clarity, POC UA 09/22/2023 Clear  Clear Final    Glucose, POC UA " 09/22/2023 Negative  Negative Final    Bilirubin, POC UA 09/22/2023 Negative  Negative Final    Ketones, POC UA 09/22/2023 Negative  Negative Final    Spec Grav POC UA 09/22/2023 >=1.030  1.005 - 1.030 Final    Blood, POC UA 09/22/2023 Negative  Negative Final    pH, POC UA 09/22/2023 5.5  5.0 - 8.0 Final    Protein, POC UA 09/22/2023 Negative  Negative Final    Urobilinogen, POC UA 09/22/2023 1.0  <=1.0 Final    Nitrite, POC UA 09/22/2023 Negative  Negative Final    WBC, POC UA 09/22/2023 Negative  Negative Final       Past Medical History:   Diagnosis Date    Binge eating disorder     Digestive disorder     Hypertension     Hypertriglyceridemia     Other seasonal allergic rhinitis     Rectal bleeding     when he wipes -    Unspecified osteoarthritis, unspecified site      Past Surgical History:   Procedure Laterality Date    CARPAL TUNNEL RELEASE Right     COLONOSCOPY N/A 09/02/2016    Procedure: COLONOSCOPY;  Surgeon: Yosi Bueno MD;  Location: [facility] ENDO;  Service: Endoscopy;  Laterality: N/A;    COLONOSCOPY N/A 12/29/2022    Procedure: COLONOSCOPY;  Surgeon: Titi Ortega MD;  Location[facility] ENDO;  Service: Endoscopy;  Laterality: N/A;    neck injections       Family History   Problem Relation Age of Onset    Hypertension Mother     Cancer Father         colon    Hypertension Brother     Gout Brother        Marital Status:   Alcohol History:  reports no history of alcohol use.  Tobacco History:  reports that he has quit smoking. His smoking use included cigarettes. He has never used smokeless tobacco.  Drug History:  reports that he does not currently use drugs.    Review of patient's allergies indicates:  No Known Allergies    Current Outpatient Medications:     celecoxib (CELEBREX) 200 MG capsule, Take 1 capsule (200 mg total) by mouth once daily., Disp: 90 capsule, Rfl: 1    loratadine (CLARITIN) 10 mg tablet, Take 1 tablet (10 mg total) by mouth once daily., Disp: 90 tablet, Rfl: 1    atorvastatin  "(LIPITOR) 10 MG tablet, Take 1 tablet (10 mg total) by mouth every evening., Disp: 90 tablet, Rfl: 1    buPROPion (WELLBUTRIN XL) 150 MG TB24 tablet, Take 1 tablet (150 mg total) by mouth once daily., Disp: 90 tablet, Rfl: 1    doxycycline (MONODOX) 100 MG capsule, Take 1 capsule (100 mg total) by mouth every 12 (twelve) hours. for 10 days, Disp: 20 capsule, Rfl: 0    DULoxetine (CYMBALTA) 30 MG capsule, Take 1 capsule (30 mg total) by mouth once daily., Disp: 90 capsule, Rfl: 1    fenofibrate (TRICOR) 145 MG tablet, Take 1 tablet (145 mg total) by mouth once daily., Disp: 90 tablet, Rfl: 1    hydroCHLOROthiazide (HYDRODIURIL) 12.5 MG Tab, Take 1 tablet (12.5 mg total) by mouth once daily., Disp: 90 tablet, Rfl: 1    irbesartan (AVAPRO) 300 MG tablet, Take 1 tablet (300 mg total) by mouth every evening., Disp: 90 tablet, Rfl: 1    lisdexamfetamine (VYVANSE) 40 MG Cap, Take 1 capsule (40 mg total) by mouth once daily., Disp: 30 capsule, Rfl: 0    Review of Systems   Constitutional:  Positive for unexpected weight change. Negative for chills, diaphoresis, fatigue and fever.   HENT:  Positive for congestion (occasional), postnasal drip (occasional) and rhinorrhea (occasional).    Eyes: Negative.    Respiratory:  Negative for cough, chest tightness, shortness of breath and wheezing.    Cardiovascular:  Negative for chest pain and palpitations.   Gastrointestinal:  Positive for anal bleeding and rectal pain. Negative for abdominal pain, constipation and diarrhea.   Neurological:  Negative for dizziness, weakness, light-headedness and headaches.   Psychiatric/Behavioral:  Negative for sleep disturbance.           Objective:      Vitals:    01/09/24 1451   BP: 138/74   Pulse: 86   SpO2: 98%   Weight: 134.7 kg (297 lb)   Height: 5' 11" (1.803 m)     Physical Exam  Constitutional:       Appearance: He is obese.   HENT:      Head: Normocephalic and atraumatic.      Nose: Nose normal.   Eyes:      Extraocular Movements: " Extraocular movements intact.      Conjunctiva/sclera: Conjunctivae normal.   Cardiovascular:      Rate and Rhythm: Normal rate and regular rhythm.      Pulses: Normal pulses.   Pulmonary:      Effort: Pulmonary effort is normal.      Breath sounds: Normal breath sounds.   Abdominal:      Palpations: Abdomen is soft.   Genitourinary:     Comments: Anal vault palpated. Fissure appreciated with firm Tender area, active mucous drainage mixed with blood    Several large external hemorrhoids seen  Skin:     General: Skin is warm and dry.   Neurological:      Mental Status: He is alert.           Assessment:       1. Abscess of anal and rectal regions    2. Hyperlipidemia, unspecified hyperlipidemia type    3. Binge eating disorder    4. Fibromyalgia    5. Essential hypertension    6. Lymphedema         Plan:       Abscess of anal and rectal regions  Comments:  likely fissure has developed into abscess. needs to start abx now. referral to Colorectal surgeon for further eval.  Orders:  -     Ambulatory referral/consult to Colorectal Surgery; Future; Expected date: 01/09/2024  -     doxycycline (MONODOX) 100 MG capsule; Take 1 capsule (100 mg total) by mouth every 12 (twelve) hours. for 10 days  Dispense: 20 capsule; Refill: 0    Hyperlipidemia, unspecified hyperlipidemia type  Comments:  stable, will plan to get labs done for me asap  Orders:  -     atorvastatin (LIPITOR) 10 MG tablet; Take 1 tablet (10 mg total) by mouth every evening.  Dispense: 90 tablet; Refill: 1  -     fenofibrate (TRICOR) 145 MG tablet; Take 1 tablet (145 mg total) by mouth once daily.  Dispense: 90 tablet; Refill: 1    Binge eating disorder  Comments:  ultimately stopped vyvanse. Sleep doing better. weight is up but happy that he can sleep and feels better overall  Orders:  -     buPROPion (WELLBUTRIN XL) 150 MG TB24 tablet; Take 1 tablet (150 mg total) by mouth once daily.  Dispense: 90 tablet; Refill: 1    Fibromyalgia  Comments:  great  improvement wit hthe addition of nsaid, cymbalta  Orders:  -     DULoxetine (CYMBALTA) 30 MG capsule; Take 1 capsule (30 mg total) by mouth once daily.  Dispense: 90 capsule; Refill: 1    Essential hypertension  Comments:  BP is at goal. continue as is.  Orders:  -     irbesartan (AVAPRO) 300 MG tablet; Take 1 tablet (300 mg total) by mouth every evening.  Dispense: 90 tablet; Refill: 1    Lymphedema  -     hydroCHLOROthiazide (HYDRODIURIL) 12.5 MG Tab; Take 1 tablet (12.5 mg total) by mouth once daily.  Dispense: 90 tablet; Refill: 1      Follow up in about 3 months (around 4/9/2024).        1/9/2024 Nilson Sykes PA-C

## 2024-02-07 ENCOUNTER — OFFICE VISIT (OUTPATIENT)
Dept: SURGERY | Facility: CLINIC | Age: 49
End: 2024-02-07
Payer: COMMERCIAL

## 2024-02-07 VITALS
DIASTOLIC BLOOD PRESSURE: 84 MMHG | SYSTOLIC BLOOD PRESSURE: 146 MMHG | HEIGHT: 71 IN | BODY MASS INDEX: 41.42 KG/M2 | HEART RATE: 85 BPM | TEMPERATURE: 98 F

## 2024-02-07 DIAGNOSIS — K61.2 ABSCESS OF ANAL AND RECTAL REGIONS: ICD-10-CM

## 2024-02-07 DIAGNOSIS — K61.2 ANORECTAL ABSCESS: Primary | ICD-10-CM

## 2024-02-07 PROCEDURE — 3077F SYST BP >= 140 MM HG: CPT | Mod: CPTII,S$GLB,, | Performed by: SURGERY

## 2024-02-07 PROCEDURE — 3079F DIAST BP 80-89 MM HG: CPT | Mod: CPTII,S$GLB,, | Performed by: SURGERY

## 2024-02-07 PROCEDURE — 3008F BODY MASS INDEX DOCD: CPT | Mod: CPTII,S$GLB,, | Performed by: SURGERY

## 2024-02-07 PROCEDURE — 1159F MED LIST DOCD IN RCRD: CPT | Mod: CPTII,S$GLB,, | Performed by: SURGERY

## 2024-02-07 PROCEDURE — 99203 OFFICE O/P NEW LOW 30 MIN: CPT | Mod: S$GLB,,, | Performed by: SURGERY

## 2024-02-07 PROCEDURE — 99999 PR PBB SHADOW E&M-EST. PATIENT-LVL III: CPT | Mod: PBBFAC,,, | Performed by: SURGERY

## 2024-02-07 PROCEDURE — 4010F ACE/ARB THERAPY RXD/TAKEN: CPT | Mod: CPTII,S$GLB,, | Performed by: SURGERY

## 2024-02-07 NOTE — PROGRESS NOTES
Subjective     Patient ID: Flaco Magaña is a 48 y.o. male.    Chief Complaint: Consult (Abscess of anal and rectal regions)    HPI   pleasant 48-year-old gentleman who was referred to me for evaluation of rectal discomfort in leakage.  Patient has been having issues for the last 8 months.  He was rather poor historian but essentially notes he had a fissure that was diagnosed over the summer of 2023.  Notes that for the last several weeks he has been having leakage in the perianal area.  Notes the area of swelling and tenderness.  Has been also having issues with prostatomegaly.  He did have a colonoscopy in 2022 with no other significant issues noted.    Review of Systems   Constitutional:  Negative for activity change and appetite change.   Cardiovascular:  Negative for chest pain.   Gastrointestinal:  Positive for rectal pain. Negative for abdominal distention, abdominal pain and vomiting.   Musculoskeletal:  Negative for arthralgias.   Hematological:  Negative for adenopathy.   Psychiatric/Behavioral:  Negative for agitation.           Objective     Physical Exam  Vitals reviewed.   Cardiovascular:      Rate and Rhythm: Normal rate.      Pulses: Normal pulses.   Pulmonary:      Effort: Pulmonary effort is normal.   Abdominal:      General: Abdomen is flat. There is no distension.      Tenderness: There is no abdominal tenderness.      Hernia: No hernia is present.   Genitourinary:     Comments: Ext exam demonstrates large ext hemorrhoids in the R ant and R post position.  NO obvious fissure noted. ANoscopy demonstrating enlarged int hemorrhoids.  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  Further evaluation on outside does not demonstrate a definite fistula opening  Neurological:      Mental Status: He is alert.   Psychiatric:         Mood and Affect: Mood normal.            Assessment and Plan     Rectal pain      D/w pt.  He certainly has enlarged hemorrhoids which I think  are contributing to his issues.  COuld be having leakage secondary to tissue prolapse.  Exam suggest possible healing abscess or int opening consistent with fistula.  Recommend MRI.    F/u in office after         No follow-ups on file.

## 2024-02-09 ENCOUNTER — TELEPHONE (OUTPATIENT)
Dept: SURGERY | Facility: CLINIC | Age: 49
End: 2024-02-09
Payer: COMMERCIAL

## 2024-02-13 NOTE — PROGRESS NOTES
2. The status of comorbities. (See ED/admit documents)
Patient presented today for urin collection   Urine collected and prepared for lab pickup   Patient left satisfied    
No

## 2024-02-19 ENCOUNTER — HOSPITAL ENCOUNTER (OUTPATIENT)
Dept: RADIOLOGY | Facility: HOSPITAL | Age: 49
Discharge: HOME | End: 2024-02-19
Attending: SURGERY
Payer: COMMERCIAL

## 2024-02-19 DIAGNOSIS — K61.2 ANORECTAL ABSCESS: ICD-10-CM

## 2024-02-19 DIAGNOSIS — K61.2 ABSCESS OF ANAL AND RECTAL REGIONS: ICD-10-CM

## 2024-02-19 PROCEDURE — A9585 GADOBUTROL INJECTION: HCPCS | Performed by: SURGERY

## 2024-02-19 PROCEDURE — 25500020 PHARM REV CODE 255: Performed by: SURGERY

## 2024-02-19 PROCEDURE — 72197 MRI PELVIS W/O & W/DYE: CPT | Mod: TC

## 2024-02-19 RX ORDER — GADOBUTROL 604.72 MG/ML
12 INJECTION INTRAVENOUS
Status: COMPLETED | OUTPATIENT
Start: 2024-02-19 | End: 2024-02-19

## 2024-02-19 RX ADMIN — GADOBUTROL 12 ML: 604.72 INJECTION INTRAVENOUS at 04:02

## 2024-02-28 ENCOUNTER — TELEPHONE (OUTPATIENT)
Dept: SURGERY | Facility: CLINIC | Age: 49
End: 2024-02-28
Payer: COMMERCIAL

## 2024-02-28 NOTE — TELEPHONE ENCOUNTER
Attempted to call pt to review findings of MRI  NO answer.  Voicemail full    Will have office reach out to pt. No lesion or abnl seen on MRI.  Unfortunately suboptimal study.  Will have office arrange f/u.

## 2024-02-29 ENCOUNTER — PATIENT MESSAGE (OUTPATIENT)
Dept: SURGERY | Facility: CLINIC | Age: 49
End: 2024-02-29
Payer: COMMERCIAL

## 2024-02-29 ENCOUNTER — TELEPHONE (OUTPATIENT)
Dept: SURGERY | Facility: CLINIC | Age: 49
End: 2024-02-29
Payer: COMMERCIAL

## 2024-02-29 NOTE — TELEPHONE ENCOUNTER
[employer] message sent to patient.  No lesion or abnl seen on MRI. Unfortunately suboptimal study. Patient is to call to schedule a f/u with Dr. Hall. Cel[place]

## 2024-03-06 ENCOUNTER — OFFICE VISIT (OUTPATIENT)
Dept: FAMILY MEDICINE | Facility: CLINIC | Age: 49
End: 2024-03-06
Payer: COMMERCIAL

## 2024-03-06 ENCOUNTER — TELEPHONE (OUTPATIENT)
Dept: FAMILY MEDICINE | Facility: CLINIC | Age: 49
End: 2024-03-06

## 2024-03-06 VITALS
DIASTOLIC BLOOD PRESSURE: 80 MMHG | SYSTOLIC BLOOD PRESSURE: 136 MMHG | TEMPERATURE: 98 F | OXYGEN SATURATION: 95 % | WEIGHT: 298 LBS | HEIGHT: 69 IN | BODY MASS INDEX: 44.14 KG/M2 | HEART RATE: 83 BPM

## 2024-03-06 DIAGNOSIS — J32.9 SINUSITIS, UNSPECIFIED CHRONICITY, UNSPECIFIED LOCATION: ICD-10-CM

## 2024-03-06 DIAGNOSIS — E66.01 MORBID (SEVERE) OBESITY DUE TO EXCESS CALORIES: Primary | ICD-10-CM

## 2024-03-06 DIAGNOSIS — I10 ESSENTIAL HYPERTENSION: ICD-10-CM

## 2024-03-06 PROCEDURE — 1159F MED LIST DOCD IN RCRD: CPT | Mod: CPTII,S$GLB,, | Performed by: NURSE PRACTITIONER

## 2024-03-06 PROCEDURE — 3008F BODY MASS INDEX DOCD: CPT | Mod: CPTII,S$GLB,, | Performed by: NURSE PRACTITIONER

## 2024-03-06 PROCEDURE — 3079F DIAST BP 80-89 MM HG: CPT | Mod: CPTII,S$GLB,, | Performed by: NURSE PRACTITIONER

## 2024-03-06 PROCEDURE — 99213 OFFICE O/P EST LOW 20 MIN: CPT | Mod: 25,S$GLB,, | Performed by: NURSE PRACTITIONER

## 2024-03-06 PROCEDURE — 1160F RVW MEDS BY RX/DR IN RCRD: CPT | Mod: CPTII,S$GLB,, | Performed by: NURSE PRACTITIONER

## 2024-03-06 PROCEDURE — 96372 THER/PROPH/DIAG INJ SC/IM: CPT | Mod: S$GLB,,, | Performed by: NURSE PRACTITIONER

## 2024-03-06 PROCEDURE — 4010F ACE/ARB THERAPY RXD/TAKEN: CPT | Mod: CPTII,S$GLB,, | Performed by: NURSE PRACTITIONER

## 2024-03-06 PROCEDURE — 3075F SYST BP GE 130 - 139MM HG: CPT | Mod: CPTII,S$GLB,, | Performed by: NURSE PRACTITIONER

## 2024-03-06 RX ORDER — DEXAMETHASONE SODIUM PHOSPHATE 4 MG/ML
8 INJECTION, SOLUTION INTRA-ARTICULAR; INTRALESIONAL; INTRAMUSCULAR; INTRAVENOUS; SOFT TISSUE ONCE
Status: COMPLETED | OUTPATIENT
Start: 2024-03-06 | End: 2024-03-06

## 2024-03-06 RX ORDER — AZITHROMYCIN 250 MG/1
TABLET, FILM COATED ORAL
Qty: 6 TABLET | Refills: 0 | Status: SHIPPED | OUTPATIENT
Start: 2024-03-06 | End: 2024-03-11

## 2024-03-06 RX ORDER — BENZONATATE 200 MG/1
200 CAPSULE ORAL 3 TIMES DAILY PRN
Qty: 30 CAPSULE | Refills: 1 | Status: SHIPPED | OUTPATIENT
Start: 2024-03-06 | End: 2024-03-16

## 2024-03-06 RX ADMIN — DEXAMETHASONE SODIUM PHOSPHATE 8 MG: 4 INJECTION, SOLUTION INTRA-ARTICULAR; INTRALESIONAL; INTRAMUSCULAR; INTRAVENOUS; SOFT TISSUE at 01:03

## 2024-03-06 NOTE — TELEPHONE ENCOUNTER
----- Message from Shavon Chatman sent at 3/6/2024  8:05 AM CST -----  Contact: Aneta  Pt needs to be seen asap. Cold symptoms, cough. Aneta @[phone]

## 2024-03-07 RX ORDER — PROMETHAZINE HYDROCHLORIDE AND PHENYLEPHRINE HYDROCHLORIDE 6.25; 5 MG/5ML; MG/5ML
5 SYRUP ORAL EVERY 6 HOURS PRN
Qty: 240 ML | Refills: 0 | Status: SHIPPED | OUTPATIENT
Start: 2024-03-07 | End: 2024-03-17

## 2024-03-07 NOTE — TELEPHONE ENCOUNTER
I called patient and scheduled him to see Dr. Hall on Thursday, 3/21/24 @ 3:30pm in [place].  [place]

## 2024-03-16 PROBLEM — E66.01 MORBID (SEVERE) OBESITY DUE TO EXCESS CALORIES: Status: ACTIVE | Noted: 2024-03-16

## 2024-03-16 NOTE — PROGRESS NOTES
SUBJECTIVE:    Patient ID: Flaco Magaña is a 48 y.o. male.    Chief Complaint: Nasal Congestion, Cough, Sore Throat, and Shortness of Breath (No bottles///Pt c/o cough, congestion, sore throat, little bit of SOB x3 weeks. 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 Denies fever, chill, body aches. Taking tylenol cold nyquil. Pt refuses covid or flu test//JL)     48 y.o. male presents for urgent visit. He is treated for  HLD, HTN, fibromyalgia, binge eating, today he is presenting with complaints of not feeling well. Symptoms have been present >1 week. No fever. Post nasal drip. Sore throat. Productive cough. Wife with similar symptoms. Have tried otc meds with minimal improvement    Cough  Associated symptoms include a sore throat and shortness of breath. Pertinent negatives include no chest pain, chills, ear pain or fever.   Sore Throat   Associated symptoms include congestion, coughing and shortness of breath. Pertinent negatives include no ear discharge or ear pain.   Shortness of Breath  Associated symptoms include a sore throat. Pertinent negatives include no chest pain, ear pain, fever or leg swelling.       Clinical Support on 12/29/2023   Component Date Value Ref Range Status    Specimen UA 12/29/2023 Urine, Clean Catch   Final    Color, UA 12/29/2023 Yellow  Yellow, Straw, Marj Final    Appearance, UA 12/29/2023 Hazy (A)  Clear Final    pH, UA 12/29/2023 6.0  5.0 - 8.0 Final    Specific Gravity, UA 12/29/2023 1.015  1.005 - 1.030 Final    Protein, UA 12/29/2023 Negative  Negative Final    Glucose, UA 12/29/2023 Negative  Negative Final    Ketones, UA 12/29/2023 Negative  Negative Final    Bilirubin (UA) 12/29/2023 Negative  Negative Final    Occult Blood UA 12/29/2023 Negative  Negative Final    Nitrite, UA 12/29/2023 Negative  Negative Final    Leukocytes, UA 12/29/2023 Negative  Negative Final    Urine Culture, Routine 12/29/2023 No growth   Final   Office Visit on  09/22/2023   Component Date Value Ref Range Status    Color, POC UA 09/22/2023 Yellow  Yellow, Straw, Colorless Final    Clarity, POC UA 09/22/2023 Clear  Clear Final    Glucose, POC UA 09/22/2023 Negative  Negative Final    Bilirubin, POC UA 09/22/2023 Negative  Negative Final    Ketones, POC UA 09/22/2023 Negative  Negative Final    Spec Grav POC UA 09/22/2023 >=1.030  1.005 - 1.030 Final    Blood, POC UA 09/22/2023 Negative  Negative Final    pH, POC UA 09/22/2023 5.5  5.0 - 8.0 Final    Protein, POC UA 09/22/2023 Negative  Negative Final    Urobilinogen, POC UA 09/22/2023 1.0  <=1.0 Final    Nitrite, POC UA 09/22/2023 Negative  Negative Final    WBC, POC UA 09/22/2023 Negative  Negative Final       Past Medical History:   Diagnosis Date    Binge eating disorder     Digestive disorder     Hypertension     Hypertriglyceridemia     Other seasonal allergic rhinitis     Rectal bleeding     when he wipes -    Unspecified osteoarthritis, unspecified site      Social History     Socioeconomic History    Marital status:    Occupational History    Occupation: [employer]   Tobacco Use    Smoking status: Former     Current packs/day: 1.00     Types: Cigarettes    Smokeless tobacco: Never   Substance and Sexual Activity    Alcohol use: No    Drug use: Not Currently   Social History Narrative    ** Merged History Encounter **          Past Surgical History:   Procedure Laterality Date    CARPAL TUNNEL RELEASE Right     COLONOSCOPY N/A 09/02/2016    Procedure: COLONOSCOPY;  Surgeon: Yosi Bueno MD;  Location: [facility];  Service: Endoscopy;  Laterality: N/A;    COLONOSCOPY N/A 12/29/2022    Procedure: COLONOSCOPY;  Surgeon: Titi Ortega MD;  Location: [facility] ENDO;  Service: Endoscopy;  Laterality: N/A;    neck injections       Family History   Problem Relation Age of Onset    Hypertension Mother     Cancer Father         colon    Hypertension Brother     Gout Brother        All of your core healthy metrics  "are met.      Review of patient's allergies indicates:  No Known Allergies    Current Outpatient Medications:     atorvastatin (LIPITOR) 10 MG tablet, Take 1 tablet (10 mg total) by mouth every evening., Disp: 90 tablet, Rfl: 1    celecoxib (CELEBREX) 200 MG capsule, Take 1 capsule (200 mg total) by mouth once daily., Disp: 90 capsule, Rfl: 1    DULoxetine (CYMBALTA) 30 MG capsule, Take 1 capsule (30 mg total) by mouth once daily., Disp: 90 capsule, Rfl: 1    fenofibrate (TRICOR) 145 MG tablet, Take 1 tablet (145 mg total) by mouth once daily., Disp: 90 tablet, Rfl: 1    irbesartan (AVAPRO) 300 MG tablet, Take 1 tablet (300 mg total) by mouth every evening., Disp: 90 tablet, Rfl: 1    loratadine (CLARITIN) 10 mg tablet, Take 1 tablet (10 mg total) by mouth once daily., Disp: 90 tablet, Rfl: 1    benzonatate (TESSALON) 200 MG capsule, Take 1 capsule (200 mg total) by mouth 3 (three) times daily as needed for Cough., Disp: 30 capsule, Rfl: 1    promethazine-phenylephrine (PROMETHAZINE VC) 6.25-5 mg/5 mL syrup, Take 5 mLs by mouth every 6 (six) hours as needed., Disp: 240 mL, Rfl: 0    Review of Systems   Constitutional:  Negative for chills and fever.   HENT:  Positive for congestion, sinus pressure and sore throat. Negative for ear discharge and ear pain.    Eyes:  Negative for discharge.   Respiratory:  Positive for cough and shortness of breath.    Cardiovascular:  Negative for chest pain and leg swelling.          Objective:      Vitals:    03/06/24 1244   BP: 136/80   Pulse: 83   Temp: 98.2 °F (36.8 °C)   TempSrc: Oral   SpO2: 95%   Weight: 135.2 kg (298 lb)   Height: 5' 9" (1.753 m)     Physical Exam  Vitals and nursing note reviewed.   Constitutional:       General: He is not in acute distress.     Appearance: Normal appearance. He is well-developed. He is obese. He is not ill-appearing.   HENT:      Right Ear: External ear normal.      Left Ear: External ear normal.      Nose: Congestion and rhinorrhea " present.      Right Sinus: Frontal sinus tenderness present.      Left Sinus: Frontal sinus tenderness present.      Mouth/Throat:      Pharynx: Posterior oropharyngeal erythema present.      Tonsils: No tonsillar exudate.   Cardiovascular:      Rate and Rhythm: Normal rate and regular rhythm.      Heart sounds: Normal heart sounds.   Pulmonary:      Breath sounds: Normal breath sounds.   Lymphadenopathy:      Cervical: No cervical adenopathy.   Neurological:      Mental Status: He is alert.           Assessment:       1. Morbid (severe) obesity due to excess calories    2. Sinusitis, unspecified chronicity, unspecified location    3. Essential hypertension         Plan:       Morbid (severe) obesity due to excess calories  Comments:  planning weight loss surgery    Sinusitis, unspecified chronicity, unspecified location  Comments:  zpack. tessalon decadron    Essential hypertension  Comments:  bp controlled    Other orders  -     dexAMETHasone injection 8 mg  -     azithromycin (Z-COBY) 250 MG tablet; Take 2 tablets by mouth on day 1; Take 1 tablet by mouth on days 2-5  Dispense: 6 tablet; Refill: 0  -     benzonatate (TESSALON) 200 MG capsule; Take 1 capsule (200 mg total) by mouth 3 (three) times daily as needed for Cough.  Dispense: 30 capsule; Refill: 1  -     promethazine-phenylephrine (PROMETHAZINE VC) 6.25-5 mg/5 mL syrup; Take 5 mLs by mouth every 6 (six) hours as needed.  Dispense: 240 mL; Refill: 0      Follow up if symptoms worsen or fail to improve, for medication management.        3/16/2024 Zehra Theodore

## 2024-03-21 ENCOUNTER — OFFICE VISIT (OUTPATIENT)
Dept: SURGERY | Facility: CLINIC | Age: 49
End: 2024-03-21
Payer: COMMERCIAL

## 2024-03-21 VITALS
SYSTOLIC BLOOD PRESSURE: 174 MMHG | TEMPERATURE: 98 F | HEART RATE: 91 BPM | BODY MASS INDEX: 44.57 KG/M2 | DIASTOLIC BLOOD PRESSURE: 79 MMHG | WEIGHT: 300.94 LBS | HEIGHT: 69 IN

## 2024-03-21 DIAGNOSIS — K62.89 RECTAL PAIN: Primary | ICD-10-CM

## 2024-03-21 PROCEDURE — 99213 OFFICE O/P EST LOW 20 MIN: CPT | Mod: S$GLB,,, | Performed by: SURGERY

## 2024-03-21 PROCEDURE — 3077F SYST BP >= 140 MM HG: CPT | Mod: CPTII,S$GLB,, | Performed by: SURGERY

## 2024-03-21 PROCEDURE — 3008F BODY MASS INDEX DOCD: CPT | Mod: CPTII,S$GLB,, | Performed by: SURGERY

## 2024-03-21 PROCEDURE — 4010F ACE/ARB THERAPY RXD/TAKEN: CPT | Mod: CPTII,S$GLB,, | Performed by: SURGERY

## 2024-03-21 PROCEDURE — 3078F DIAST BP <80 MM HG: CPT | Mod: CPTII,S$GLB,, | Performed by: SURGERY

## 2024-03-21 PROCEDURE — 99999 PR PBB SHADOW E&M-EST. PATIENT-LVL III: CPT | Mod: PBBFAC,,, | Performed by: SURGERY

## 2024-03-21 PROCEDURE — 1159F MED LIST DOCD IN RCRD: CPT | Mod: CPTII,S$GLB,, | Performed by: SURGERY

## 2024-03-21 NOTE — PROGRESS NOTES
48-year-old gentleman returns to the office to discuss MRI results.  He notes he does have continued pressure and occasional bleeding from the rectum.  Is concerned about a wound with possible fistula.  Discussion with patient reviewed MRI no significant abnormality noted on MRI.  Offered rectal exam today in the office patient declined however.  Based on previous notes he did have large hemorrhoids have recommended treating hemorrhoids with fiber.  Would recommend fiber twice a day.  If symptoms persist recommend return to clinic in 4-6 weeks for evaluation

## 2024-04-05 ENCOUNTER — TELEPHONE (OUTPATIENT)
Dept: FAMILY MEDICINE | Facility: CLINIC | Age: 49
End: 2024-04-05
Payer: COMMERCIAL

## 2024-04-05 NOTE — TELEPHONE ENCOUNTER
Advised to urgent care, no appt available, states its getting more swollen and red each day. Entire ankle swollen now, needs an exam. Wife voiced agreement

## 2024-04-05 NOTE — TELEPHONE ENCOUNTER
----- Message from Viridiana Gifford sent at 4/5/2024  8:17 AM CDT -----  Contact: uche  Wife uche villavicencio pt was bite by a spider on Wednesday and leg is very swollen. Pt would like to be seen today.   [phone]

## 2024-04-08 ENCOUNTER — PATIENT MESSAGE (OUTPATIENT)
Dept: FAMILY MEDICINE | Facility: CLINIC | Age: 49
End: 2024-04-08
Payer: COMMERCIAL

## 2024-04-09 ENCOUNTER — OFFICE VISIT (OUTPATIENT)
Dept: FAMILY MEDICINE | Facility: CLINIC | Age: 49
End: 2024-04-09
Payer: COMMERCIAL

## 2024-04-09 VITALS
WEIGHT: 298 LBS | HEART RATE: 91 BPM | BODY MASS INDEX: 44.14 KG/M2 | RESPIRATION RATE: 18 BRPM | DIASTOLIC BLOOD PRESSURE: 65 MMHG | HEIGHT: 69 IN | OXYGEN SATURATION: 97 % | SYSTOLIC BLOOD PRESSURE: 119 MMHG

## 2024-04-09 DIAGNOSIS — M79.7 FIBROMYALGIA: ICD-10-CM

## 2024-04-09 DIAGNOSIS — I10 ESSENTIAL HYPERTENSION: ICD-10-CM

## 2024-04-09 DIAGNOSIS — E78.5 HYPERLIPIDEMIA, UNSPECIFIED HYPERLIPIDEMIA TYPE: ICD-10-CM

## 2024-04-09 DIAGNOSIS — L03.115 CELLULITIS OF RIGHT LOWER EXTREMITY: Primary | ICD-10-CM

## 2024-04-09 PROCEDURE — 3074F SYST BP LT 130 MM HG: CPT | Mod: CPTII,S$GLB,, | Performed by: PHYSICIAN ASSISTANT

## 2024-04-09 PROCEDURE — 4010F ACE/ARB THERAPY RXD/TAKEN: CPT | Mod: CPTII,S$GLB,, | Performed by: PHYSICIAN ASSISTANT

## 2024-04-09 PROCEDURE — 99214 OFFICE O/P EST MOD 30 MIN: CPT | Mod: S$GLB,,, | Performed by: PHYSICIAN ASSISTANT

## 2024-04-09 PROCEDURE — 1159F MED LIST DOCD IN RCRD: CPT | Mod: CPTII,S$GLB,, | Performed by: PHYSICIAN ASSISTANT

## 2024-04-09 PROCEDURE — 3078F DIAST BP <80 MM HG: CPT | Mod: CPTII,S$GLB,, | Performed by: PHYSICIAN ASSISTANT

## 2024-04-09 PROCEDURE — 3008F BODY MASS INDEX DOCD: CPT | Mod: CPTII,S$GLB,, | Performed by: PHYSICIAN ASSISTANT

## 2024-04-09 RX ORDER — IRBESARTAN 300 MG/1
300 TABLET ORAL NIGHTLY
Qty: 90 TABLET | Refills: 1 | Status: SHIPPED | OUTPATIENT
Start: 2024-04-09 | End: 2024-05-21 | Stop reason: SDUPTHER

## 2024-04-09 RX ORDER — FENOFIBRATE 145 MG/1
145 TABLET, FILM COATED ORAL DAILY
Qty: 90 TABLET | Refills: 1 | Status: SHIPPED | OUTPATIENT
Start: 2024-04-09 | End: 2024-05-21 | Stop reason: SDUPTHER

## 2024-04-09 RX ORDER — CELECOXIB 200 MG/1
200 CAPSULE ORAL DAILY
Qty: 90 CAPSULE | Refills: 1 | Status: SHIPPED | OUTPATIENT
Start: 2024-04-09 | End: 2024-05-21 | Stop reason: SDUPTHER

## 2024-04-09 RX ORDER — DOXYCYCLINE 100 MG/1
100 CAPSULE ORAL 2 TIMES DAILY
Qty: 20 CAPSULE | Refills: 0 | Status: SHIPPED | OUTPATIENT
Start: 2024-04-09 | End: 2024-04-19

## 2024-04-09 RX ORDER — ATORVASTATIN CALCIUM 10 MG/1
10 TABLET, FILM COATED ORAL NIGHTLY
Qty: 90 TABLET | Refills: 1 | Status: SHIPPED | OUTPATIENT
Start: 2024-04-09 | End: 2024-05-21 | Stop reason: SDUPTHER

## 2024-04-09 RX ORDER — DULOXETIN HYDROCHLORIDE 30 MG/1
30 CAPSULE, DELAYED RELEASE ORAL DAILY
Qty: 90 CAPSULE | Refills: 1 | Status: SHIPPED | OUTPATIENT
Start: 2024-04-09 | End: 2024-05-21 | Stop reason: SDUPTHER

## 2024-04-09 NOTE — PROGRESS NOTES
SUBJECTIVE:    Patient ID: Flaco Magaña is a 48 y.o. male.    Chief Complaint: Follow-up (No bottles// refills needed// pt states his right leg is redness and swelling no pain and no itching since 04/04 went to urgent care was given ointment // no other complaints today)    This is a 49 yo male who presents for urgent evaluation. He was seen at the urgent care ([facility]) and evaluated for insect bite. Given doxycycline for suspected cellulitis. Reports only minimal improvement. Leg is still swollen and the redness about the same. ON bactrim now, 7 days.     He is planning for gastric sleeve procedure- Dr. Conti in [facility]. [facility] Conti and [facility]. Scheduled for June.     Follow-up  Associated symptoms include a rash.       Clinical Support on 12/29/2023   Component Date Value Ref Range Status    Specimen UA 12/29/2023 Urine, Clean Catch   Final    Color, UA 12/29/2023 Yellow  Yellow, Straw, Marj Final    Appearance, UA 12/29/2023 Hazy (A)  Clear Final    pH, UA 12/29/2023 6.0  5.0 - 8.0 Final    Specific Gravity, UA 12/29/2023 1.015  1.005 - 1.030 Final    Protein, UA 12/29/2023 Negative  Negative Final    Glucose, UA 12/29/2023 Negative  Negative Final    Ketones, UA 12/29/2023 Negative  Negative Final    Bilirubin (UA) 12/29/2023 Negative  Negative Final    Occult Blood UA 12/29/2023 Negative  Negative Final    Nitrite, UA 12/29/2023 Negative  Negative Final    Leukocytes, UA 12/29/2023 Negative  Negative Final    Urine Culture, Routine 12/29/2023 No growth   Final       Past Medical History:   Diagnosis Date    Binge eating disorder     Digestive disorder     Hypertension     Hypertriglyceridemia     Other seasonal allergic rhinitis     Rectal bleeding     when he wipes -    Unspecified osteoarthritis, unspecified site      Past Surgical History:   Procedure Laterality Date    CARPAL TUNNEL RELEASE Right     COLONOSCOPY N/A 09/02/2016    Procedure: COLONOSCOPY;  Surgeon: Yosi Bueno MD;   Location: [facility] ENDO;  Service: Endoscopy;  Laterality: N/A;    COLONOSCOPY N/A 12/29/2022    Procedure: COLONOSCOPY;  Surgeon: Titi Ortega MD;  Location: [facility] ENDO;  Service: Endoscopy;  Laterality: N/A;    neck injections       Family History   Problem Relation Age of Onset    Hypertension Mother     Cancer Father         colon    Hypertension Brother     Gout Brother        Marital Status:   Alcohol History:  reports no history of alcohol use.  Tobacco History:  reports that he has quit smoking. His smoking use included cigarettes. He has never used smokeless tobacco.  Drug History:  reports that he does not currently use drugs.    Review of patient's allergies indicates:  No Known Allergies    Current Outpatient Medications:     loratadine (CLARITIN) 10 mg tablet, Take 1 tablet (10 mg total) by mouth once daily., Disp: 90 tablet, Rfl: 1    atorvastatin (LIPITOR) 10 MG tablet, Take 1 tablet (10 mg total) by mouth every evening., Disp: 90 tablet, Rfl: 1    celecoxib (CELEBREX) 200 MG capsule, Take 1 capsule (200 mg total) by mouth once daily., Disp: 90 capsule, Rfl: 1    diltiazem HCl (DILTIAZEM 2% CREAM), Apply topically 3 (three) times daily. Apply topically to anal area. (Patient not taking: Reported on 4/9/2024), Disp: 30 g, Rfl: 0    doxycycline (MONODOX) 100 MG capsule, Take 1 capsule (100 mg total) by mouth 2 (two) times daily. for 10 days, Disp: 20 capsule, Rfl: 0    DULoxetine (CYMBALTA) 30 MG capsule, Take 1 capsule (30 mg total) by mouth once daily., Disp: 90 capsule, Rfl: 1    fenofibrate (TRICOR) 145 MG tablet, Take 1 tablet (145 mg total) by mouth once daily., Disp: 90 tablet, Rfl: 1    irbesartan (AVAPRO) 300 MG tablet, Take 1 tablet (300 mg total) by mouth every evening., Disp: 90 tablet, Rfl: 1    Review of Systems   Skin:  Positive for rash and wound.          Objective:      Vitals:    04/09/24 1046   BP: 119/65   Pulse: 91   Resp: 18   SpO2: 97%   Weight: 135.2 kg (298 lb)   Height:  "5' 9" (1.753 m)     Physical Exam  Constitutional:       General: He is not in acute distress.     Appearance: He is well-developed.   HENT:      Head: Normocephalic and atraumatic.   Eyes:      Pupils: Pupils are equal, round, and reactive to light.   Neck:      Thyroid: No thyromegaly.   Cardiovascular:      Rate and Rhythm: Normal rate and regular rhythm.      Heart sounds: Normal heart sounds.   Pulmonary:      Effort: Pulmonary effort is normal.      Breath sounds: Normal breath sounds.   Abdominal:      General: Bowel sounds are normal. There is no distension.      Palpations: Abdomen is soft.      Tenderness: There is no abdominal tenderness.   Musculoskeletal:         General: Normal range of motion.      Cervical back: Normal range of motion and neck supple.   Skin:     General: Skin is warm and dry.      Findings: No erythema or rash.   Neurological:      Mental Status: He is alert and oriented to person, place, and time.      Cranial Nerves: No cranial nerve deficit.           Assessment:       1. Cellulitis of right lower extremity    2. Essential hypertension    3. Hyperlipidemia, unspecified hyperlipidemia type    4. Fibromyalgia         Plan:       Cellulitis of right lower extremity  Comments:  will add doxy now, continue to monitor closely and if worsened, not improved please let me know.  Orders:  -     doxycycline (MONODOX) 100 MG capsule; Take 1 capsule (100 mg total) by mouth 2 (two) times daily. for 10 days  Dispense: 20 capsule; Refill: 0    Essential hypertension  Comments:  BP is at goal. continue as is.  Orders:  -     irbesartan (AVAPRO) 300 MG tablet; Take 1 tablet (300 mg total) by mouth every evening.  Dispense: 90 tablet; Refill: 1    Hyperlipidemia, unspecified hyperlipidemia type  Comments:  stable, will plan to get labs done for me asap  Orders:  -     fenofibrate (TRICOR) 145 MG tablet; Take 1 tablet (145 mg total) by mouth once daily.  Dispense: 90 tablet; Refill: 1  -     " atorvastatin (LIPITOR) 10 MG tablet; Take 1 tablet (10 mg total) by mouth every evening.  Dispense: 90 tablet; Refill: 1    Fibromyalgia  Comments:  great improvement wit hthe addition of nsaid, cymbalta  Orders:  -     DULoxetine (CYMBALTA) 30 MG capsule; Take 1 capsule (30 mg total) by mouth once daily.  Dispense: 90 capsule; Refill: 1  -     celecoxib (CELEBREX) 200 MG capsule; Take 1 capsule (200 mg total) by mouth once daily.  Dispense: 90 capsule; Refill: 1      Follow up in about 6 months (around 10/9/2024).        4/9/2024 Nilson Sykes PA-C

## 2024-05-21 ENCOUNTER — OFFICE VISIT (OUTPATIENT)
Dept: FAMILY MEDICINE | Facility: CLINIC | Age: 49
End: 2024-05-21
Payer: COMMERCIAL

## 2024-05-21 VITALS
DIASTOLIC BLOOD PRESSURE: 81 MMHG | OXYGEN SATURATION: 95 % | SYSTOLIC BLOOD PRESSURE: 123 MMHG | BODY MASS INDEX: 44.28 KG/M2 | WEIGHT: 299 LBS | RESPIRATION RATE: 18 BRPM | HEART RATE: 78 BPM | HEIGHT: 69 IN

## 2024-05-21 DIAGNOSIS — J30.9 ALLERGIC SINUSITIS: ICD-10-CM

## 2024-05-21 DIAGNOSIS — I10 ESSENTIAL HYPERTENSION: ICD-10-CM

## 2024-05-21 DIAGNOSIS — M79.7 FIBROMYALGIA: ICD-10-CM

## 2024-05-21 DIAGNOSIS — Z01.818 PRE-OPERATIVE CLEARANCE: Primary | ICD-10-CM

## 2024-05-21 DIAGNOSIS — E78.5 HYPERLIPIDEMIA, UNSPECIFIED HYPERLIPIDEMIA TYPE: ICD-10-CM

## 2024-05-21 PROCEDURE — 3008F BODY MASS INDEX DOCD: CPT | Mod: CPTII,S$GLB,, | Performed by: PHYSICIAN ASSISTANT

## 2024-05-21 PROCEDURE — 3079F DIAST BP 80-89 MM HG: CPT | Mod: CPTII,S$GLB,, | Performed by: PHYSICIAN ASSISTANT

## 2024-05-21 PROCEDURE — 4010F ACE/ARB THERAPY RXD/TAKEN: CPT | Mod: CPTII,S$GLB,, | Performed by: PHYSICIAN ASSISTANT

## 2024-05-21 PROCEDURE — 1159F MED LIST DOCD IN RCRD: CPT | Mod: CPTII,S$GLB,, | Performed by: PHYSICIAN ASSISTANT

## 2024-05-21 PROCEDURE — 3074F SYST BP LT 130 MM HG: CPT | Mod: CPTII,S$GLB,, | Performed by: PHYSICIAN ASSISTANT

## 2024-05-21 PROCEDURE — 99214 OFFICE O/P EST MOD 30 MIN: CPT | Mod: S$GLB,,, | Performed by: PHYSICIAN ASSISTANT

## 2024-05-21 RX ORDER — DULOXETIN HYDROCHLORIDE 30 MG/1
30 CAPSULE, DELAYED RELEASE ORAL DAILY
Qty: 90 CAPSULE | Refills: 1 | Status: SHIPPED | OUTPATIENT
Start: 2024-05-21 | End: 2025-05-21

## 2024-05-21 RX ORDER — CELECOXIB 200 MG/1
200 CAPSULE ORAL DAILY
Qty: 90 CAPSULE | Refills: 1 | Status: SHIPPED | OUTPATIENT
Start: 2024-05-21

## 2024-05-21 RX ORDER — ATORVASTATIN CALCIUM 10 MG/1
10 TABLET, FILM COATED ORAL NIGHTLY
Qty: 90 TABLET | Refills: 1 | Status: SHIPPED | OUTPATIENT
Start: 2024-05-21 | End: 2025-05-21

## 2024-05-21 RX ORDER — LORATADINE 10 MG/1
10 TABLET ORAL DAILY
Qty: 90 TABLET | Refills: 1 | Status: SHIPPED | OUTPATIENT
Start: 2024-05-21 | End: 2025-05-21

## 2024-05-21 RX ORDER — IRBESARTAN 300 MG/1
300 TABLET ORAL NIGHTLY
Qty: 90 TABLET | Refills: 1 | Status: SHIPPED | OUTPATIENT
Start: 2024-05-21 | End: 2025-05-21

## 2024-05-21 RX ORDER — FENOFIBRATE 145 MG/1
145 TABLET, FILM COATED ORAL DAILY
Qty: 90 TABLET | Refills: 1 | Status: SHIPPED | OUTPATIENT
Start: 2024-05-21

## 2024-05-21 NOTE — PROGRESS NOTES
SUBJECTIVE:    Patient ID: Flaco Magaña is a 48 y.o. male.    Chief Complaint: Follow-up (No bottles// refills needed//lab results// pt states he have no complaints today)    48 year old male presents for surgery clearance. He is doing well and has been taking medications as prescribed. He is having a gastric sleeve surgery June 17th with Dr. Conti. [employer].    Follow-up  Pertinent negatives include no fatigue, fever, headaches or myalgias.       Clinical Support on 12/29/2023   Component Date Value Ref Range Status    Specimen UA 12/29/2023 Urine, Clean Catch   Final    Color, UA 12/29/2023 Yellow  Yellow, Straw, Marj Final    Appearance, UA 12/29/2023 Hazy (A)  Clear Final    pH, UA 12/29/2023 6.0  5.0 - 8.0 Final    Specific Gravity, UA 12/29/2023 1.015  1.005 - 1.030 Final    Protein, UA 12/29/2023 Negative  Negative Final    Glucose, UA 12/29/2023 Negative  Negative Final    Ketones, UA 12/29/2023 Negative  Negative Final    Bilirubin (UA) 12/29/2023 Negative  Negative Final    Occult Blood UA 12/29/2023 Negative  Negative Final    Nitrite, UA 12/29/2023 Negative  Negative Final    Leukocytes, UA 12/29/2023 Negative  Negative Final    Urine Culture, Routine 12/29/2023 No growth   Final       Past Medical History:   Diagnosis Date    Binge eating disorder     Digestive disorder     Hypertension     Hypertriglyceridemia     Other seasonal allergic rhinitis     Rectal bleeding     when he wipes -    Unspecified osteoarthritis, unspecified site      Past Surgical History:   Procedure Laterality Date    CARPAL TUNNEL RELEASE Right     COLONOSCOPY N/A 09/02/2016    Procedure: COLONOSCOPY;  Surgeon: Yosi Bueno MD;  Location: [facility];  Service: Endoscopy;  Laterality: N/A;    COLONOSCOPY N/A 12/29/2022    Procedure: COLONOSCOPY;  Surgeon: Titi Ortega MD;  Location: [facility] ENDO;  Service: Endoscopy;  Laterality: N/A;    neck injections       Family History   Problem Relation Name Age of Onset  "   Hypertension Mother      Cancer Father          colon    Hypertension Brother      Gout Brother         Marital Status:   Alcohol History:  reports no history of alcohol use.  Tobacco History:  reports that he has quit smoking. His smoking use included cigarettes. He has never used smokeless tobacco.  Drug History:  reports that he does not currently use drugs.    Review of patient's allergies indicates:  No Known Allergies    Current Outpatient Medications:     atorvastatin (LIPITOR) 10 MG tablet, Take 1 tablet (10 mg total) by mouth every evening., Disp: 90 tablet, Rfl: 1    celecoxib (CELEBREX) 200 MG capsule, Take 1 capsule (200 mg total) by mouth once daily., Disp: 90 capsule, Rfl: 1    diltiazem HCl (DILTIAZEM 2% CREAM), Apply topically 3 (three) times daily. Apply topically to anal area. (Patient not taking: Reported on 4/9/2024), Disp: 30 g, Rfl: 0    DULoxetine (CYMBALTA) 30 MG capsule, Take 1 capsule (30 mg total) by mouth once daily., Disp: 90 capsule, Rfl: 1    fenofibrate (TRICOR) 145 MG tablet, Take 1 tablet (145 mg total) by mouth once daily., Disp: 90 tablet, Rfl: 1    irbesartan (AVAPRO) 300 MG tablet, Take 1 tablet (300 mg total) by mouth every evening., Disp: 90 tablet, Rfl: 1    loratadine (CLARITIN) 10 mg tablet, Take 1 tablet (10 mg total) by mouth once daily., Disp: 90 tablet, Rfl: 1    Review of Systems   Constitutional:  Negative for fatigue and fever.   HENT: Negative.     Respiratory:  Negative for chest tightness and shortness of breath.    Cardiovascular:  Negative for palpitations.   Musculoskeletal:  Negative for myalgias.   Neurological:  Negative for dizziness and headaches.          Objective:      Vitals:    05/21/24 0804   BP: 123/81   Pulse: 78   Resp: 18   SpO2: 95%   Weight: 135.6 kg (299 lb)   Height: 5' 9" (1.753 m)     Physical Exam  Constitutional:       Appearance: Normal appearance.   HENT:      Head: Normocephalic and atraumatic.   Cardiovascular:      Rate and " Rhythm: Normal rate and regular rhythm.      Pulses: Normal pulses.      Heart sounds: Normal heart sounds.   Pulmonary:      Effort: Pulmonary effort is normal.      Breath sounds: Normal breath sounds.   Skin:     General: Skin is warm and dry.   Neurological:      Mental Status: He is alert and oriented to person, place, and time.           Assessment:       1. Pre-operative clearance    2. Allergic sinusitis    3. Hyperlipidemia, unspecified hyperlipidemia type    4. Fibromyalgia    5. Essential hypertension           Plan:       Pre-operative clearance  Comments:  Cleared from primary standppoint. planning for gastric sleeve. would like to stop BP meds post op.    Allergic sinusitis  -     loratadine (CLARITIN) 10 mg tablet; Take 1 tablet (10 mg total) by mouth once daily.  Dispense: 90 tablet; Refill: 1    Hyperlipidemia, unspecified hyperlipidemia type  Comments:  stable, will plan to get labs done for me asap  Orders:  -     atorvastatin (LIPITOR) 10 MG tablet; Take 1 tablet (10 mg total) by mouth every evening.  Dispense: 90 tablet; Refill: 1  -     fenofibrate (TRICOR) 145 MG tablet; Take 1 tablet (145 mg total) by mouth once daily.  Dispense: 90 tablet; Refill: 1    Fibromyalgia  Comments:  great improvement with the addition of nsaid, cymbalta. will need to stop the celecoxib after the surgery.  Orders:  -     celecoxib (CELEBREX) 200 MG capsule; Take 1 capsule (200 mg total) by mouth once daily.  Dispense: 90 capsule; Refill: 1  -     DULoxetine (CYMBALTA) 30 MG capsule; Take 1 capsule (30 mg total) by mouth once daily.  Dispense: 90 capsule; Refill: 1    Essential hypertension  Comments:  BP is at goal. continue as is.  Orders:  -     irbesartan (AVAPRO) 300 MG tablet; Take 1 tablet (300 mg total) by mouth every evening.  Dispense: 90 tablet; Refill: 1      Problem List Items Addressed This Visit    None  Visit Diagnoses       Pre-operative clearance    -  Primary    Cleared from primary standppoint.  planning for gastric sleeve. would like to stop BP meds post op.    Allergic sinusitis        Relevant Medications    loratadine (CLARITIN) 10 mg tablet    Hyperlipidemia, unspecified hyperlipidemia type        stable, will plan to get labs done for me asap    Relevant Medications    atorvastatin (LIPITOR) 10 MG tablet    fenofibrate (TRICOR) 145 MG tablet    Fibromyalgia        great improvement with the addition of nsaid, cymbalta. will need to stop the celecoxib after the surgery.    Relevant Medications    celecoxib (CELEBREX) 200 MG capsule    DULoxetine (CYMBALTA) 30 MG capsule    Essential hypertension        BP is at goal. continue as is.    Relevant Medications    irbesartan (AVAPRO) 300 MG tablet          Follow up in about 6 weeks (around 7/2/2024) for post -op followup.        5/21/2024 Nilson Sykes PA-C

## 2024-05-25 NOTE — TELEPHONE ENCOUNTER
I called and spoke to patients wife Aneta about getting his MRI scheduled.  She stated that she missed a call and wasn't sure who to call back.  I asked Nitza De Leon to call her back to schedule his MRI.  [place]  
MRI Pelvis w/wo contrast [facility]  
Patient 60-year-old male past medical history of high cholesterol presented to ED for evaluation of left foot pain status post mechanical fall.  Reports hitting his knees and then his shoulder on the ground and before hitting his head on the ground, denies any LOC anticoagulant use.  Complains of left shoulder pain and left foot pain. Otherwise denies any fever, chills, headache, changes in vision, cough, congestion, cp, palpitations, sob, n/v/d, abd pain, constipation, urinary complaints.

## 2024-07-08 ENCOUNTER — OFFICE VISIT (OUTPATIENT)
Dept: FAMILY MEDICINE | Facility: CLINIC | Age: 49
End: 2024-07-08
Payer: COMMERCIAL

## 2024-07-08 VITALS
OXYGEN SATURATION: 95 % | WEIGHT: 275 LBS | HEART RATE: 75 BPM | BODY MASS INDEX: 37.25 KG/M2 | SYSTOLIC BLOOD PRESSURE: 122 MMHG | DIASTOLIC BLOOD PRESSURE: 70 MMHG | HEIGHT: 72 IN

## 2024-07-08 DIAGNOSIS — T81.49XA INFLAMMATION OF OPERATIVE INCISION: Primary | ICD-10-CM

## 2024-07-08 DIAGNOSIS — Z79.899 ENCOUNTER FOR LONG-TERM (CURRENT) USE OF OTHER MEDICATIONS: ICD-10-CM

## 2024-07-08 DIAGNOSIS — E29.1 HYPOGONADISM IN MALE: ICD-10-CM

## 2024-07-08 DIAGNOSIS — I10 ESSENTIAL HYPERTENSION: ICD-10-CM

## 2024-07-08 PROCEDURE — 3078F DIAST BP <80 MM HG: CPT | Mod: CPTII,S$GLB,, | Performed by: PHYSICIAN ASSISTANT

## 2024-07-08 PROCEDURE — 3008F BODY MASS INDEX DOCD: CPT | Mod: CPTII,S$GLB,, | Performed by: PHYSICIAN ASSISTANT

## 2024-07-08 PROCEDURE — 4010F ACE/ARB THERAPY RXD/TAKEN: CPT | Mod: CPTII,S$GLB,, | Performed by: PHYSICIAN ASSISTANT

## 2024-07-08 PROCEDURE — 1159F MED LIST DOCD IN RCRD: CPT | Mod: CPTII,S$GLB,, | Performed by: PHYSICIAN ASSISTANT

## 2024-07-08 PROCEDURE — 99214 OFFICE O/P EST MOD 30 MIN: CPT | Mod: S$GLB,,, | Performed by: PHYSICIAN ASSISTANT

## 2024-07-08 PROCEDURE — 3074F SYST BP LT 130 MM HG: CPT | Mod: CPTII,S$GLB,, | Performed by: PHYSICIAN ASSISTANT

## 2024-07-08 RX ORDER — MUPIROCIN 20 MG/G
OINTMENT TOPICAL 3 TIMES DAILY
Qty: 30 G | Refills: 1 | Status: SHIPPED | OUTPATIENT
Start: 2024-07-08 | End: 2024-08-07

## 2024-07-08 NOTE — PROGRESS NOTES
SUBJECTIVE:    Patient ID: Flaco Magaña is a 48 y.o. male.    Chief Complaint: Follow-up (No bottles//Pt is here for a check up, from post-op gastric sleeve//Pt stated that his down 25lbs, since the procedure//DINO )    49 yo male presents for regular checkup and refills. He is s/p gastric sleeve procedure in [place]. Already down about 25 lb. Surgery went great and no immediate complications. Feels great and following all instructions.    Follow-up  Pertinent negatives include no abdominal pain, arthralgias, chest pain, congestion, coughing, fatigue, fever or weakness.       No visits with results within 6 Month(s) from this visit.   Latest known visit with results is:   Clinical Support on 12/29/2023   Component Date Value Ref Range Status    Specimen UA 12/29/2023 Urine, Clean Catch   Final    Color, UA 12/29/2023 Yellow  Yellow, Straw, Marj Final    Appearance, UA 12/29/2023 Hazy (A)  Clear Final    pH, UA 12/29/2023 6.0  5.0 - 8.0 Final    Specific Gravity, UA 12/29/2023 1.015  1.005 - 1.030 Final    Protein, UA 12/29/2023 Negative  Negative Final    Glucose, UA 12/29/2023 Negative  Negative Final    Ketones, UA 12/29/2023 Negative  Negative Final    Bilirubin (UA) 12/29/2023 Negative  Negative Final    Occult Blood UA 12/29/2023 Negative  Negative Final    Nitrite, UA 12/29/2023 Negative  Negative Final    Leukocytes, UA 12/29/2023 Negative  Negative Final    Urine Culture, Routine 12/29/2023 No growth   Final       Past Medical History:   Diagnosis Date    Binge eating disorder     Digestive disorder     Hypertension     Hypertriglyceridemia     Other seasonal allergic rhinitis     Rectal bleeding     when he wipes -    Unspecified osteoarthritis, unspecified site      Past Surgical History:   Procedure Laterality Date    CARPAL TUNNEL RELEASE Right     COLONOSCOPY N/A 09/02/2016    Procedure: COLONOSCOPY;  Surgeon: Yosi Bueno MD;  Location: [facility];  Service: Endoscopy;  Laterality: N/A;     COLONOSCOPY N/A 12/29/2022    Procedure: COLONOSCOPY;  Surgeon: Titi Ortega MD;  [facility];  Service: Endoscopy;  Laterality: N/A;    LAPAROSCOPIC SLEEVE GASTRECTOMY  06/17/2024    neck injections       Family History   Problem Relation Name Age of Onset    Hypertension Mother      Cancer Father          colon    Hypertension Brother      Gout Brother         Marital Status:   Alcohol History:  reports no history of alcohol use.  Tobacco History:  reports that he has quit smoking. His smoking use included cigarettes. He has been exposed to tobacco smoke. He has never used smokeless tobacco.  Drug History:  reports that he does not currently use drugs.    Review of patient's allergies indicates:  No Known Allergies    Current Outpatient Medications:     atorvastatin (LIPITOR) 10 MG tablet, Take 1 tablet (10 mg total) by mouth every evening., Disp: 90 tablet, Rfl: 1    celecoxib (CELEBREX) 200 MG capsule, Take 1 capsule (200 mg total) by mouth once daily., Disp: 90 capsule, Rfl: 1    fenofibrate (TRICOR) 145 MG tablet, Take 1 tablet (145 mg total) by mouth once daily., Disp: 90 tablet, Rfl: 1    loratadine (CLARITIN) 10 mg tablet, Take 1 tablet (10 mg total) by mouth once daily., Disp: 90 tablet, Rfl: 1    mupirocin (BACTROBAN) 2 % ointment, Apply topically 3 (three) times daily., Disp: 30 g, Rfl: 1    Review of Systems   Constitutional:  Negative for activity change, fatigue, fever and unexpected weight change.   HENT:  Negative for congestion.    Respiratory:  Negative for apnea, cough, chest tightness and shortness of breath.    Cardiovascular:  Negative for chest pain and palpitations.   Gastrointestinal:  Negative for abdominal distention and abdominal pain.   Genitourinary:  Negative for difficulty urinating and dysuria.   Musculoskeletal:  Negative for arthralgias and back pain.   Neurological:  Negative for dizziness and weakness.          Objective:      Vitals:    07/08/24 1444   BP:  122/70   Pulse: 75   SpO2: 95%   Weight: 124.7 kg (275 lb)   Height: 6' (1.829 m)     Physical Exam  Constitutional:       General: He is not in acute distress.     Appearance: He is well-developed.   HENT:      Head: Normocephalic and atraumatic.   Eyes:      Pupils: Pupils are equal, round, and reactive to light.   Neck:      Thyroid: No thyromegaly.   Cardiovascular:      Rate and Rhythm: Normal rate and regular rhythm.      Heart sounds: Normal heart sounds.   Pulmonary:      Effort: Pulmonary effort is normal.      Breath sounds: Normal breath sounds.   Abdominal:      General: Bowel sounds are normal. There is no distension.      Palpations: Abdomen is soft.      Tenderness: There is no abdominal tenderness.   Musculoskeletal:         General: Normal range of motion.      Cervical back: Normal range of motion and neck supple.   Skin:     General: Skin is warm and dry.      Findings: No erythema or rash.      Comments: Lap incisions all clean and dry. Larger incision midline with some erythema surrounding.    Neurological:      Mental Status: He is alert and oriented to person, place, and time.      Cranial Nerves: No cranial nerve deficit.           Assessment:       1. Inflammation of operative incision    2. Encounter for long-term (current) use of other medications    3. Hypogonadism in male    4. Essential hypertension         Plan:       Inflammation of operative incision  Comments:  trial with mupirocin applied BID. If not improving would consider po at that time.  Orders:  -     mupirocin (BACTROBAN) 2 % ointment; Apply topically 3 (three) times daily.  Dispense: 30 g; Refill: 1    Encounter for long-term (current) use of other medications  -     CBC W/ AUTO DIFFERENTIAL; Future; Expected date: 07/08/2024  -     COMPREHENSIVE METABOLIC PANEL; Future; Expected date: 07/08/2024  -     Lipid Panel; Future; Expected date: 07/08/2024  -     Testosterone, Total, LC/MS/MS; Future; Expected date:  07/08/2024    Hypogonadism in male  Comments:  check T level tomorrow.  Orders:  -     Testosterone, Total, LC/MS/MS; Future; Expected date: 07/08/2024    Essential hypertension  Comments:  since gastric sleeve, BP has been well managed. off all po meds. continue as is.      Follow up in about 4 weeks (around 8/5/2024).        7/8/2024 Nilson Sykes PA-C

## 2024-07-10 LAB
ALBUMIN SERPL-MCNC: 4.4 G/DL (ref 3.6–5.1)
ALBUMIN/GLOB SERPL: 2.2 (CALC) (ref 1–2.5)
ALP SERPL-CCNC: 73 U/L (ref 36–130)
ALT SERPL-CCNC: 39 U/L (ref 9–46)
AST SERPL-CCNC: 24 U/L (ref 10–40)
BASOPHILS # BLD AUTO: 49 CELLS/UL (ref 0–200)
BASOPHILS NFR BLD AUTO: 1.4 %
BILIRUB SERPL-MCNC: 0.8 MG/DL (ref 0.2–1.2)
BUN SERPL-MCNC: 13 MG/DL (ref 7–25)
BUN/CREAT SERPL: ABNORMAL (CALC) (ref 6–22)
CALCIUM SERPL-MCNC: 9.3 MG/DL (ref 8.6–10.3)
CHLORIDE SERPL-SCNC: 108 MMOL/L (ref 98–110)
CHOLEST SERPL-MCNC: 164 MG/DL
CHOLEST/HDLC SERPL: 4.8 (CALC)
CO2 SERPL-SCNC: 26 MMOL/L (ref 20–32)
CREAT SERPL-MCNC: 0.93 MG/DL (ref 0.6–1.29)
EGFR: 101 ML/MIN/1.73M2
EOSINOPHIL # BLD AUTO: 91 CELLS/UL (ref 15–500)
EOSINOPHIL NFR BLD AUTO: 2.6 %
ERYTHROCYTE [DISTWIDTH] IN BLOOD BY AUTOMATED COUNT: 13.6 % (ref 11–15)
GLOBULIN SER CALC-MCNC: 2 G/DL (CALC) (ref 1.9–3.7)
GLUCOSE SERPL-MCNC: 105 MG/DL (ref 65–99)
HCT VFR BLD AUTO: 40.9 % (ref 38.5–50)
HDLC SERPL-MCNC: 34 MG/DL
HGB BLD-MCNC: 13.3 G/DL (ref 13.2–17.1)
LDLC SERPL CALC-MCNC: 90 MG/DL (CALC)
LYMPHOCYTES # BLD AUTO: 1155 CELLS/UL (ref 850–3900)
LYMPHOCYTES NFR BLD AUTO: 33 %
MCH RBC QN AUTO: 28.7 PG (ref 27–33)
MCHC RBC AUTO-ENTMCNC: 32.5 G/DL (ref 32–36)
MCV RBC AUTO: 88.1 FL (ref 80–100)
MONOCYTES # BLD AUTO: 221 CELLS/UL (ref 200–950)
MONOCYTES NFR BLD AUTO: 6.3 %
NEUTROPHILS # BLD AUTO: 1985 CELLS/UL (ref 1500–7800)
NEUTROPHILS NFR BLD AUTO: 56.7 %
NONHDLC SERPL-MCNC: 130 MG/DL (CALC)
PLATELET # BLD AUTO: 234 THOUSAND/UL (ref 140–400)
PMV BLD REES-ECKER: 9.8 FL (ref 7.5–12.5)
POTASSIUM SERPL-SCNC: 4.3 MMOL/L (ref 3.5–5.3)
PROT SERPL-MCNC: 6.4 G/DL (ref 6.1–8.1)
RBC # BLD AUTO: 4.64 MILLION/UL (ref 4.2–5.8)
SODIUM SERPL-SCNC: 143 MMOL/L (ref 135–146)
TESTOST SERPL-MCNC: 241 NG/DL (ref 250–1100)
TRIGL SERPL-MCNC: 277 MG/DL
WBC # BLD AUTO: 3.5 THOUSAND/UL (ref 3.8–10.8)

## 2024-07-26 ENCOUNTER — OFFICE VISIT (OUTPATIENT)
Dept: FAMILY MEDICINE | Facility: CLINIC | Age: 49
End: 2024-07-26
Payer: COMMERCIAL

## 2024-07-26 VITALS
HEART RATE: 76 BPM | RESPIRATION RATE: 18 BRPM | DIASTOLIC BLOOD PRESSURE: 70 MMHG | OXYGEN SATURATION: 97 % | HEIGHT: 72 IN | WEIGHT: 266 LBS | SYSTOLIC BLOOD PRESSURE: 118 MMHG | BODY MASS INDEX: 36.03 KG/M2

## 2024-07-26 DIAGNOSIS — E29.1 HYPOGONADISM IN MALE: ICD-10-CM

## 2024-07-26 DIAGNOSIS — E78.5 HYPERLIPIDEMIA, UNSPECIFIED HYPERLIPIDEMIA TYPE: Primary | ICD-10-CM

## 2024-07-26 DIAGNOSIS — E66.01 MORBID (SEVERE) OBESITY DUE TO EXCESS CALORIES: ICD-10-CM

## 2024-07-26 DIAGNOSIS — Z90.3 S/P GASTRIC SLEEVE PROCEDURE: ICD-10-CM

## 2024-07-26 RX ORDER — ROSUVASTATIN CALCIUM 5 MG/1
5 TABLET, COATED ORAL DAILY
Qty: 90 TABLET | Refills: 3 | Status: SHIPPED | OUTPATIENT
Start: 2024-07-26 | End: 2025-07-26

## 2024-07-26 NOTE — PROGRESS NOTES
SUBJECTIVE:    Patient ID: Flaco Magaña is a 48 y.o. male.    Chief Complaint: Follow-up (No bottles// no refills needed// lab results pt states he recently had weight loss surgery 06/17 states he have not been taking any of his medication waiting on provider to clear him to see if he needs to continue //also needs a clearance to go back to work )    49 yo male presents for regular checkup and refills. He is s/p gastric sleeve procedure in [place]. Already down about 35 lb. Surgery went great and no immediate complications. Feels great and following all instructions. Monitoring the BP closely at home. He was off all meds and cholesterol doing ok. Triglycerides still high. Agreeable to going back on low dose statin.    Follow-up  Pertinent negatives include no abdominal pain, arthralgias, chest pain, congestion, coughing, fatigue, fever or weakness.       Office Visit on 07/08/2024   Component Date Value Ref Range Status    WBC 07/09/2024 3.5 (L)  3.8 - 10.8 Thousand/uL Final    RBC 07/09/2024 4.64  4.20 - 5.80 Million/uL Final    Hemoglobin 07/09/2024 13.3  13.2 - 17.1 g/dL Final    Hematocrit 07/09/2024 40.9  38.5 - 50.0 % Final    MCV 07/09/2024 88.1  80.0 - 100.0 fL Final    MCH 07/09/2024 28.7  27.0 - 33.0 pg Final    MCHC 07/09/2024 32.5  32.0 - 36.0 g/dL Final    RDW 07/09/2024 13.6  11.0 - 15.0 % Final    Platelets 07/09/2024 234  140 - 400 Thousand/uL Final    MPV 07/09/2024 9.8  7.5 - 12.5 fL Final    Neutrophils, Abs 07/09/2024 1,985  1,500 - 7,800 cells/uL Final    Lymph # 07/09/2024 1,155  850 - 3,900 cells/uL Final    Mono # 07/09/2024 221  200 - 950 cells/uL Final    Eos # 07/09/2024 91  15 - 500 cells/uL Final    Baso # 07/09/2024 49  0 - 200 cells/uL Final    Neutrophils Relative 07/09/2024 56.7  % Final    Lymph % 07/09/2024 33.0  % Final    Mono % 07/09/2024 6.3  % Final    Eosinophil % 07/09/2024 2.6  % Final    Basophil % 07/09/2024 1.4  % Final    Glucose 07/09/2024 105 (H)  65 - 99 mg/dL  Final    BUN 07/09/2024 13  7 - 25 mg/dL Final    Creatinine 07/09/2024 0.93  0.60 - 1.29 mg/dL Final    eGFR 07/09/2024 101  > OR = 60 mL/min/1.73m2 Final    BUN/Creatinine Ratio 07/09/2024 SEE NOTE:  6 - 22 (calc) Final    Sodium 07/09/2024 143  135 - 146 mmol/L Final    Potassium 07/09/2024 4.3  3.5 - 5.3 mmol/L Final    Chloride 07/09/2024 108  98 - 110 mmol/L Final    CO2 07/09/2024 26  20 - 32 mmol/L Final    Calcium 07/09/2024 9.3  8.6 - 10.3 mg/dL Final    Total Protein 07/09/2024 6.4  6.1 - 8.1 g/dL Final    Albumin 07/09/2024 4.4  3.6 - 5.1 g/dL Final    Globulin, Total 07/09/2024 2.0  1.9 - 3.7 g/dL (calc) Final    Albumin/Globulin Ratio 07/09/2024 2.2  1.0 - 2.5 (calc) Final    Total Bilirubin 07/09/2024 0.8  0.2 - 1.2 mg/dL Final    Alkaline Phosphatase 07/09/2024 73  36 - 130 U/L Final    AST 07/09/2024 24  10 - 40 U/L Final    ALT 07/09/2024 39  9 - 46 U/L Final    Cholesterol 07/09/2024 164  <200 mg/dL Final    HDL 07/09/2024 34 (L)  > OR = 40 mg/dL Final    Triglycerides 07/09/2024 277 (H)  <150 mg/dL Final    LDL Cholesterol 07/09/2024 90  mg/dL (calc) Final    HDL/Cholesterol Ratio 07/09/2024 4.8  <5.0 (calc) Final    Non HDL Chol. (LDL+VLDL) 07/09/2024 130 (H)  <130 mg/dL (calc) Final    Testosterone 07/09/2024 241 (L)  250 - 1,100 ng/dL Final       Past Medical History:   Diagnosis Date    Binge eating disorder     Digestive disorder     Hypertension     Hypertriglyceridemia     Other seasonal allergic rhinitis     Rectal bleeding     when he wipes -    Unspecified osteoarthritis, unspecified site      Past Surgical History:   Procedure Laterality Date    CARPAL TUNNEL RELEASE Right     COLONOSCOPY N/A 09/02/2016    Procedure: COLONOSCOPY;  Surgeon: Yosi Bueno MD;  Location: [facility] ENDO;  Service: Endoscopy;  Laterality: N/A;    COLONOSCOPY N/A 12/29/2022    Procedure: COLONOSCOPY;  Surgeon: Titi Ortega MD;  Location: [facility] ENDO;  Service: Endoscopy;  Laterality: N/A;    LAPAROSCOPIC SLEEVE  GASTRECTOMY  06/17/2024    neck injections       Family History   Problem Relation Name Age of Onset    Hypertension Mother      Cancer Father          colon    Hypertension Brother      Gout Brother         Marital Status:   Alcohol History:  reports no history of alcohol use.  Tobacco History:  reports that he has quit smoking. His smoking use included cigarettes. He has been exposed to tobacco smoke. He has never used smokeless tobacco.  Drug History:  reports that he does not currently use drugs.    Review of patient's allergies indicates:  No Known Allergies    Current Outpatient Medications:     rosuvastatin (CRESTOR) 5 MG tablet, Take 1 tablet (5 mg total) by mouth once daily., Disp: 90 tablet, Rfl: 3    Review of Systems   Constitutional:  Negative for activity change, fatigue, fever and unexpected weight change.   HENT:  Negative for congestion.    Respiratory:  Negative for apnea, cough, chest tightness and shortness of breath.    Cardiovascular:  Negative for chest pain and palpitations.   Gastrointestinal:  Negative for abdominal distention and abdominal pain.   Genitourinary:  Negative for difficulty urinating and dysuria.   Musculoskeletal:  Negative for arthralgias and back pain.   Neurological:  Negative for dizziness and weakness.          Objective:      Vitals:    07/26/24 0733   BP: 118/70   Pulse: 76   Resp: 18   SpO2: 97%   Weight: 120.7 kg (266 lb)   Height: 6' (1.829 m)     Physical Exam  Constitutional:       Appearance: Normal appearance.   HENT:      Head: Normocephalic and atraumatic.   Cardiovascular:      Rate and Rhythm: Normal rate and regular rhythm.      Pulses: Normal pulses.      Heart sounds: Normal heart sounds.   Pulmonary:      Effort: Pulmonary effort is normal.      Breath sounds: Normal breath sounds.   Skin:     General: Skin is warm and dry.   Neurological:      Mental Status: He is alert and oriented to person, place, and time.           Assessment:       1.  Hyperlipidemia, unspecified hyperlipidemia type    2. Hypogonadism in male    3. Morbid (severe) obesity due to excess calories    4. S/P gastric sleeve procedure         Plan:       Hyperlipidemia, unspecified hyperlipidemia type  Comments:  going to start back on statin now. rechecking lipid/cmp in 3 mos.  Orders:  -     rosuvastatin (CRESTOR) 5 MG tablet; Take 1 tablet (5 mg total) by mouth once daily.  Dispense: 90 tablet; Refill: 3  -     COMPREHENSIVE METABOLIC PANEL; Future; Expected date: 07/26/2024  -     Lipid Panel; Future; Expected date: 07/26/2024    Hypogonadism in male  Comments:  give surgery time and settle into routine for diet/exercise. recheck in 4-6 mos  Orders:  -     Testosterone, Total, LC/MS/MS; Future; Expected date: 07/26/2024    Morbid (severe) obesity due to excess calories  Comments:  down 35 lbs and feeling well. no immediate complications from surgery.    S/P gastric sleeve procedure  Comments:  Doing well. continue as is.      Follow up in about 6 months (around 1/26/2025).        7/26/2024 Nilson Sykes PA-C

## 2024-07-26 NOTE — LETTER
July 26, 2024      [facility] [address] [place] [address]  Phone: [phone]  Fax: [phone]       Patient: Flaco Magaña   YOB: 1975  Date of Visit: 07/26/2024    To Whom It May Concern:    Jesse Magaña  was at [facility] on 07/26/2024. The patient may return to work/school on 7/29/2024 with no restrictions. If you have any questions or concerns, or if I can be of further assistance, please do not hesitate to contact me.    Sincerely,    Oly Gavin LPN  Electronically Signed By: ALONSO Acosta

## 2024-08-26 ENCOUNTER — PATIENT MESSAGE (OUTPATIENT)
Dept: FAMILY MEDICINE | Facility: CLINIC | Age: 49
End: 2024-08-26
Payer: COMMERCIAL

## 2024-10-07 ENCOUNTER — PATIENT MESSAGE (OUTPATIENT)
Dept: FAMILY MEDICINE | Facility: CLINIC | Age: 49
End: 2024-10-07
Payer: COMMERCIAL

## 2024-10-28 ENCOUNTER — PATIENT MESSAGE (OUTPATIENT)
Dept: FAMILY MEDICINE | Facility: CLINIC | Age: 49
End: 2024-10-28

## 2024-10-28 ENCOUNTER — OFFICE VISIT (OUTPATIENT)
Dept: FAMILY MEDICINE | Facility: CLINIC | Age: 49
End: 2024-10-28
Payer: COMMERCIAL

## 2024-10-28 VITALS
HEART RATE: 80 BPM | SYSTOLIC BLOOD PRESSURE: 132 MMHG | OXYGEN SATURATION: 98 % | HEIGHT: 72 IN | DIASTOLIC BLOOD PRESSURE: 74 MMHG | WEIGHT: 253.38 LBS | BODY MASS INDEX: 34.32 KG/M2

## 2024-10-28 DIAGNOSIS — K21.9 GASTROESOPHAGEAL REFLUX DISEASE, UNSPECIFIED WHETHER ESOPHAGITIS PRESENT: ICD-10-CM

## 2024-10-28 DIAGNOSIS — R31.9 HEMATURIA, UNSPECIFIED TYPE: Primary | ICD-10-CM

## 2024-10-28 LAB
BILIRUBIN, UA POC OHS: NEGATIVE
BLOOD, UA POC OHS: NEGATIVE
CLARITY, UA POC OHS: CLEAR
COLOR, UA POC OHS: YELLOW
GLUCOSE, UA POC OHS: NEGATIVE
KETONES, UA POC OHS: ABNORMAL
LEUKOCYTES, UA POC OHS: NEGATIVE
NITRITE, UA POC OHS: NEGATIVE
PH, UA POC OHS: 7
PROTEIN, UA POC OHS: 30
SPECIFIC GRAVITY, UA POC OHS: 1.02
UROBILINOGEN, UA POC OHS: >=8

## 2024-10-28 PROCEDURE — 3078F DIAST BP <80 MM HG: CPT | Mod: CPTII,S$GLB,, | Performed by: PHYSICIAN ASSISTANT

## 2024-10-28 PROCEDURE — 4010F ACE/ARB THERAPY RXD/TAKEN: CPT | Mod: CPTII,S$GLB,, | Performed by: PHYSICIAN ASSISTANT

## 2024-10-28 PROCEDURE — 3075F SYST BP GE 130 - 139MM HG: CPT | Mod: CPTII,S$GLB,, | Performed by: PHYSICIAN ASSISTANT

## 2024-10-28 PROCEDURE — 81003 URINALYSIS AUTO W/O SCOPE: CPT | Mod: QW,S$GLB,, | Performed by: PHYSICIAN ASSISTANT

## 2024-10-28 PROCEDURE — 99214 OFFICE O/P EST MOD 30 MIN: CPT | Mod: S$GLB,,, | Performed by: PHYSICIAN ASSISTANT

## 2024-10-28 PROCEDURE — 1159F MED LIST DOCD IN RCRD: CPT | Mod: CPTII,S$GLB,, | Performed by: PHYSICIAN ASSISTANT

## 2024-10-28 PROCEDURE — 3008F BODY MASS INDEX DOCD: CPT | Mod: CPTII,S$GLB,, | Performed by: PHYSICIAN ASSISTANT

## 2024-10-28 RX ORDER — PANTOPRAZOLE SODIUM 40 MG/1
40 TABLET, DELAYED RELEASE ORAL DAILY
Qty: 90 TABLET | Refills: 3 | Status: SHIPPED | OUTPATIENT
Start: 2024-10-28 | End: 2025-10-28

## 2024-10-28 RX ORDER — LORATADINE 10 MG/1
10 TABLET ORAL
COMMUNITY
Start: 2024-10-04

## 2024-10-31 LAB — BACTERIA UR CULT: NORMAL

## 2024-11-01 ENCOUNTER — TELEPHONE (OUTPATIENT)
Dept: PHARMACY | Facility: CLINIC | Age: 49
End: 2024-11-01
Payer: COMMERCIAL

## 2024-11-25 ENCOUNTER — TELEPHONE (OUTPATIENT)
Dept: FAMILY MEDICINE | Facility: CLINIC | Age: 49
End: 2024-11-25

## 2024-11-25 ENCOUNTER — PATIENT MESSAGE (OUTPATIENT)
Dept: FAMILY MEDICINE | Facility: CLINIC | Age: 49
End: 2024-11-25

## 2024-11-25 ENCOUNTER — OFFICE VISIT (OUTPATIENT)
Dept: FAMILY MEDICINE | Facility: CLINIC | Age: 49
End: 2024-11-25
Payer: COMMERCIAL

## 2024-11-25 VITALS
TEMPERATURE: 99 F | WEIGHT: 253 LBS | HEIGHT: 72 IN | BODY MASS INDEX: 34.27 KG/M2 | HEART RATE: 77 BPM | OXYGEN SATURATION: 96 % | RESPIRATION RATE: 18 BRPM | DIASTOLIC BLOOD PRESSURE: 80 MMHG | SYSTOLIC BLOOD PRESSURE: 124 MMHG

## 2024-11-25 DIAGNOSIS — J06.9 UPPER RESPIRATORY TRACT INFECTION, UNSPECIFIED TYPE: Primary | ICD-10-CM

## 2024-11-25 PROCEDURE — 99213 OFFICE O/P EST LOW 20 MIN: CPT | Mod: 25,S$GLB,, | Performed by: PHYSICIAN ASSISTANT

## 2024-11-25 PROCEDURE — 4010F ACE/ARB THERAPY RXD/TAKEN: CPT | Mod: CPTII,S$GLB,, | Performed by: PHYSICIAN ASSISTANT

## 2024-11-25 PROCEDURE — 96372 THER/PROPH/DIAG INJ SC/IM: CPT | Mod: S$GLB,,, | Performed by: PHYSICIAN ASSISTANT

## 2024-11-25 PROCEDURE — 3008F BODY MASS INDEX DOCD: CPT | Mod: CPTII,S$GLB,, | Performed by: PHYSICIAN ASSISTANT

## 2024-11-25 PROCEDURE — 3079F DIAST BP 80-89 MM HG: CPT | Mod: CPTII,S$GLB,, | Performed by: PHYSICIAN ASSISTANT

## 2024-11-25 PROCEDURE — 3074F SYST BP LT 130 MM HG: CPT | Mod: CPTII,S$GLB,, | Performed by: PHYSICIAN ASSISTANT

## 2024-11-25 PROCEDURE — 1159F MED LIST DOCD IN RCRD: CPT | Mod: CPTII,S$GLB,, | Performed by: PHYSICIAN ASSISTANT

## 2024-11-25 RX ORDER — DEXAMETHASONE SODIUM PHOSPHATE 4 MG/ML
8 INJECTION, SOLUTION INTRA-ARTICULAR; INTRALESIONAL; INTRAMUSCULAR; INTRAVENOUS; SOFT TISSUE ONCE
Status: COMPLETED | OUTPATIENT
Start: 2024-11-25 | End: 2024-11-25

## 2024-11-25 RX ADMIN — DEXAMETHASONE SODIUM PHOSPHATE 8 MG: 4 INJECTION, SOLUTION INTRA-ARTICULAR; INTRALESIONAL; INTRAMUSCULAR; INTRAVENOUS; SOFT TISSUE at 11:11

## 2024-11-25 NOTE — PROGRESS NOTES
SUBJECTIVE:    Patient ID: Flaco Magaña is a 48 y.o. male.    Chief Complaint: Sore Throat (No bottles//no refills needed//pt states he's been having sore throat  and runny nose no headaches no chills no sweats and no bodyaches going on for 1 day //pt refused testing )    History of Present Illness    CHIEF COMPLAINT:  Flaco presents today for upper respiratory symptoms.    UPPER RESPIRATORY SYMPTOMS:  He reports sore throat, runny nose, mild non-productive cough, and sinus pressure that started yesterday morning. He denies fever, chills, body aches, or headaches. He has been taking OTC Benadryl and Advil Cold and Sinus for symptom relief, but notes difficulty tolerating Advil Cold and Sinus due to stomach discomfort. He also recalls previous stomach issues following the use of Z-Angel after a past surgery.    EAR DISCOMFORT:  He reports occasional discomfort in one ear, with a history of fluid in the ear which is typically present.    CURRENT MEDICATIONS:  He is currently taking Claritin (Loratadine), Protonix, and Crestor as prescribed.    WEIGHT MANAGEMENT:  He reports recent weight fluctuations, noting a change from 244 lbs to 253 lbs in a short period. He has been attempting to lose weight by cutting out carbohydrates and switching to brown sugar. He mentions experiencing a slowdown in weight loss and is attempting to accelerate the process again. He reports significant weight loss of approximately 50 lbs following a previous surgery.    JOINT PAIN:  He reports joint pain, noting difficulty getting up from the floor. He also experiences hand pain that worsens in cold weather. He mentions some improvement in joint pain after switching to brown sugar in his diet.      ROS:  General: -fever, -chills, -fatigue, -weight gain, +weight loss  Eyes: -vision changes, -redness, -discharge  ENT: +ear pain, -nasal congestion, +sore throat  Cardiovascular: -chest pain, -palpitations, -lower extremity edema  Respiratory:  +cough, -shortness of breath  Gastrointestinal: -abdominal pain, -nausea, -vomiting, -diarrhea, -constipation, -blood in stool  Genitourinary: -dysuria, -hematuria, -frequency  Musculoskeletal: +joint pain, -muscle pain  Skin: -rash, -lesion  Neurological: -headache, -dizziness, -numbness, -tingling  Psychiatric: -anxiety, -depression, -sleep difficulty         Office Visit on 10/28/2024   Component Date Value Ref Range Status    Color, POC UA 10/28/2024 Yellow  Yellow, Straw, Colorless Final    Clarity, POC UA 10/28/2024 Clear  Clear Final    Glucose, POC UA 10/28/2024 Negative  Negative Final    Bilirubin, POC UA 10/28/2024 Negative  Negative Final    Ketones, POC UA 10/28/2024 Trace (A)  Negative Final    Spec Grav POC UA 10/28/2024 1.020  1.005 - 1.030 Final    Blood, POC UA 10/28/2024 Negative  Negative Final    pH, POC UA 10/28/2024 7.0  5.0 - 8.0 Final    Protein, POC UA 10/28/2024 30 (A)  Negative Final    Urobilinogen, POC UA 10/28/2024 >=8.0 (A)  <=1.0 Final    Nitrite, POC UA 10/28/2024 Negative  Negative Final    WBC, POC UA 10/28/2024 Negative  Negative Final    Urine Culture, Routine 10/29/2024 SEE COMMENT   Final   Office Visit on 07/26/2024   Component Date Value Ref Range Status    Glucose 10/08/2024 97  65 - 99 mg/dL Final    BUN 10/08/2024 12  7 - 25 mg/dL Final    Creatinine 10/08/2024 0.86  0.60 - 1.29 mg/dL Final    eGFR 10/08/2024 106  > OR = 60 mL/min/1.73m2 Final    BUN/Creatinine Ratio 10/08/2024 SEE NOTE:  6 - 22 (calc) Final    Sodium 10/08/2024 142  135 - 146 mmol/L Final    Potassium 10/08/2024 4.1  3.5 - 5.3 mmol/L Final    Chloride 10/08/2024 105  98 - 110 mmol/L Final    CO2 10/08/2024 31  20 - 32 mmol/L Final    Calcium 10/08/2024 9.5  8.6 - 10.3 mg/dL Final    Total Protein 10/08/2024 6.5  6.1 - 8.1 g/dL Final    Albumin 10/08/2024 4.3  3.6 - 5.1 g/dL Final    Globulin, Total 10/08/2024 2.2  1.9 - 3.7 g/dL (calc) Final    Albumin/Globulin Ratio 10/08/2024 2.0  1.0 - 2.5 (calc)  Final    Total Bilirubin 10/08/2024 0.9  0.2 - 1.2 mg/dL Final    Alkaline Phosphatase 10/08/2024 78  36 - 130 U/L Final    AST 10/08/2024 19  10 - 40 U/L Final    ALT 10/08/2024 29  9 - 46 U/L Final    Cholesterol 10/08/2024 158  <200 mg/dL Final    HDL 10/08/2024 37 (L)  > OR = 40 mg/dL Final    Triglycerides 10/08/2024 239 (H)  <150 mg/dL Final    LDL Cholesterol 10/08/2024 88  mg/dL (calc) Final    HDL/Cholesterol Ratio 10/08/2024 4.3  <5.0 (calc) Final    Non HDL Chol. (LDL+VLDL) 10/08/2024 121  <130 mg/dL (calc) Final    Testosterone 10/08/2024 268  250 - 1,100 ng/dL Final   Office Visit on 07/08/2024   Component Date Value Ref Range Status    WBC 07/09/2024 3.5 (L)  3.8 - 10.8 Thousand/uL Final    RBC 07/09/2024 4.64  4.20 - 5.80 Million/uL Final    Hemoglobin 07/09/2024 13.3  13.2 - 17.1 g/dL Final    Hematocrit 07/09/2024 40.9  38.5 - 50.0 % Final    MCV 07/09/2024 88.1  80.0 - 100.0 fL Final    MCH 07/09/2024 28.7  27.0 - 33.0 pg Final    MCHC 07/09/2024 32.5  32.0 - 36.0 g/dL Final    RDW 07/09/2024 13.6  11.0 - 15.0 % Final    Platelets 07/09/2024 234  140 - 400 Thousand/uL Final    MPV 07/09/2024 9.8  7.5 - 12.5 fL Final    Neutrophils, Abs 07/09/2024 1,985  1,500 - 7,800 cells/uL Final    Lymph # 07/09/2024 1,155  850 - 3,900 cells/uL Final    Mono # 07/09/2024 221  200 - 950 cells/uL Final    Eos # 07/09/2024 91  15 - 500 cells/uL Final    Baso # 07/09/2024 49  0 - 200 cells/uL Final    Neutrophils Relative 07/09/2024 56.7  % Final    Lymph % 07/09/2024 33.0  % Final    Mono % 07/09/2024 6.3  % Final    Eosinophil % 07/09/2024 2.6  % Final    Basophil % 07/09/2024 1.4  % Final    Glucose 07/09/2024 105 (H)  65 - 99 mg/dL Final    BUN 07/09/2024 13  7 - 25 mg/dL Final    Creatinine 07/09/2024 0.93  0.60 - 1.29 mg/dL Final    eGFR 07/09/2024 101  > OR = 60 mL/min/1.73m2 Final    BUN/Creatinine Ratio 07/09/2024 SEE NOTE:  6 - 22 (calc) Final    Sodium 07/09/2024 143  135 - 146 mmol/L Final    Potassium  07/09/2024 4.3  3.5 - 5.3 mmol/L Final    Chloride 07/09/2024 108  98 - 110 mmol/L Final    CO2 07/09/2024 26  20 - 32 mmol/L Final    Calcium 07/09/2024 9.3  8.6 - 10.3 mg/dL Final    Total Protein 07/09/2024 6.4  6.1 - 8.1 g/dL Final    Albumin 07/09/2024 4.4  3.6 - 5.1 g/dL Final    Globulin, Total 07/09/2024 2.0  1.9 - 3.7 g/dL (calc) Final    Albumin/Globulin Ratio 07/09/2024 2.2  1.0 - 2.5 (calc) Final    Total Bilirubin 07/09/2024 0.8  0.2 - 1.2 mg/dL Final    Alkaline Phosphatase 07/09/2024 73  36 - 130 U/L Final    AST 07/09/2024 24  10 - 40 U/L Final    ALT 07/09/2024 39  9 - 46 U/L Final    Cholesterol 07/09/2024 164  <200 mg/dL Final    HDL 07/09/2024 34 (L)  > OR = 40 mg/dL Final    Triglycerides 07/09/2024 277 (H)  <150 mg/dL Final    LDL Cholesterol 07/09/2024 90  mg/dL (calc) Final    HDL/Cholesterol Ratio 07/09/2024 4.8  <5.0 (calc) Final    Non HDL Chol. (LDL+VLDL) 07/09/2024 130 (H)  <130 mg/dL (calc) Final    Testosterone 07/09/2024 241 (L)  250 - 1,100 ng/dL Final       Past Medical History:   Diagnosis Date    Binge eating disorder     Digestive disorder     Hypertension     Hypertriglyceridemia     Other seasonal allergic rhinitis     Rectal bleeding     when he wipes -    Unspecified osteoarthritis, unspecified site      Past Surgical History:   Procedure Laterality Date    CARPAL TUNNEL RELEASE Right     COLONOSCOPY N/A 09/02/2016    Procedure: COLONOSCOPY;  Surgeon: Yosi Bueno MD;  Location: [facility];  Service: Endoscopy;  Laterality: N/A;    COLONOSCOPY N/A 12/29/2022    Procedure: COLONOSCOPY;  Surgeon: Titi Ortega MD;  Location: [facility] ENDO;  Service: Endoscopy;  Laterality: N/A;    LAPAROSCOPIC SLEEVE GASTRECTOMY  06/17/2024    neck injections       Family History   Problem Relation Name Age of Onset    Hypertension Mother      Cancer Father          colon    Hypertension Brother      Gout Brother         Marital Status:   Alcohol History:  reports no history of alcohol  use.  Tobacco History:  reports that he has quit smoking. His smoking use included cigarettes. He has been exposed to tobacco smoke. He has never used smokeless tobacco.  Drug History:  reports that he does not currently use drugs.    Review of patient's allergies indicates:  No Known Allergies    Current Outpatient Medications:     loratadine (CLARITIN) 10 mg tablet, Take 10 mg by mouth., Disp: , Rfl:     pantoprazole (PROTONIX) 40 MG tablet, Take 1 tablet (40 mg total) by mouth once daily., Disp: 90 tablet, Rfl: 3    rosuvastatin (CRESTOR) 5 MG tablet, Take 1 tablet (5 mg total) by mouth once daily., Disp: 90 tablet, Rfl: 3    Current Facility-Administered Medications:     dexAMETHasone injection 8 mg, 8 mg, Intramuscular, Once, Nilson Sykes PA-C    Objective:      Vitals:    11/25/24 1057   BP: 124/80   Pulse: 77   Resp: 18   Temp: 98.8 °F (37.1 °C)   TempSrc: Oral   SpO2: 96%   Weight: 114.8 kg (253 lb)   Height: 6' (1.829 m)     Physical Exam    General: No acute distress. Well-developed. Well-nourished.  Eyes: EOMI. Sclerae anicteric.  HENT: Normocephalic. Atraumatic. Nares patent. Moist oral mucosa. Left middle turbinate swollen.  Ears: Fluid in ear. Bilateral EACs clear.  Cardiovascular: Regular rate. Regular rhythm. No murmurs. No rubs. No gallops. Normal S1, S2.  Respiratory: Normal respiratory effort. Clear to auscultation bilaterally. No rales. No rhonchi. No wheezing.  Abdomen: Soft. Non-tender. Non-distended. Normoactive bowel sounds.  Musculoskeletal: No  obvious deformity.  Extremities: No lower extremity edema.  Neurological: Alert & oriented x3. No slurred speech. Normal gait.  Psychiatric: Normal mood. Normal affect. Good insight. Good judgment.  Skin: Warm. Dry. No rash.         Assessment:       Assessment & Plan    - Assessed patient's symptoms as likely viral infection, ruling out COVID or flu due to absence of fever, chills, and body aches  - Determined steroid injection appropriate  for symptom management  - Considered antibiotic prescription if symptoms persist or worsen beyond next week    THROAT PAIN AND CHRONIC PHARYNGITIS:  - Use throat lozenges in moderation to soothe throat.    POSTNASAL DRIP AND NASAL CONGESTION:  - Started Mucinex D for thinning mucus and addressing sinus and chest congestion.  - Continue with antihistamine.  - Continued Loratadine (Claritin).    ACUTE COUGH:  - Administered 8 mg (2 cc) of Decadron via injection in clinic.    FOLLOW-UP:  - Contact the office if symptoms worsen or persist through next weekend into next week.       Plan:       Upper respiratory tract infection, unspecified type  -     dexAMETHasone injection 8 mg      Follow up if symptoms worsen or fail to improve.    This note was generated with the assistance of ambient listening technology. Verbal consent was obtained by the patient and accompanying visitor(s) for the recording of patient appointment to facilitate this note. I attest to having reviewed and edited the generated note for accuracy, though some syntax or spelling errors may persist. Please contact the author of this note for any clarification.          11/25/2024 Nilson Sykes PA-C      Answers submitted by the patient for this visit:  Sore Throat Questionnaire (Submitted on 11/25/2024)  Chief Complaint: Sore throat  Chronicity: new  Onset: yesterday  Progression since onset: rapidly worsening  Pain worse on: neither  Fever: no fever  Fever duration: less than 1 day  Pain - numeric: 8/10  abdominal pain: No  congestion: Yes  cough: Yes  diarrhea: No  drooling: No  ear discharge: No  ear pain: No  headaches: No  hoarse voice: No  neck pain: No  plugged ear sensation: No  stridor: No  shortness of breath: No  swollen glands: No  trouble swallowing: Yes  vomiting: No  Treatments tried: NSAIDs, acetaminophen  Improvement on treatment: mild  Pain severity: severe

## 2024-11-25 NOTE — TELEPHONE ENCOUNTER
Reason for visit: Very sore throat      Comments:   I have been taking over the counter meds all weekend but my throat has gotten worse. It's very sore this morning   Appt request, scheduled for today

## 2024-11-25 NOTE — TELEPHONE ENCOUNTER
His symptoms have been present for only 1 day. An antibiotic is not needed as this is a virus/allergy thing at this point. I told him to call us back if no better in 7-10 days and we would consider abx at that time.

## 2024-12-03 RX ORDER — AMOXICILLIN AND CLAVULANATE POTASSIUM 875; 125 MG/1; MG/1
1 TABLET, FILM COATED ORAL EVERY 12 HOURS
Qty: 20 TABLET | Refills: 0 | Status: SHIPPED | OUTPATIENT
Start: 2024-12-03

## 2025-01-08 DIAGNOSIS — E78.5 HYPERLIPIDEMIA, UNSPECIFIED HYPERLIPIDEMIA TYPE: ICD-10-CM

## 2025-01-08 DIAGNOSIS — K21.9 GASTROESOPHAGEAL REFLUX DISEASE, UNSPECIFIED WHETHER ESOPHAGITIS PRESENT: ICD-10-CM

## 2025-01-08 RX ORDER — PANTOPRAZOLE SODIUM 40 MG/1
40 TABLET, DELAYED RELEASE ORAL DAILY
Qty: 90 TABLET | Refills: 3 | Status: SHIPPED | OUTPATIENT
Start: 2025-01-08 | End: 2026-01-08

## 2025-01-08 RX ORDER — ROSUVASTATIN CALCIUM 5 MG/1
5 TABLET, COATED ORAL DAILY
Qty: 90 TABLET | Refills: 3 | Status: SHIPPED | OUTPATIENT
Start: 2025-01-08 | End: 2026-01-08

## 2025-01-14 ENCOUNTER — TELEPHONE (OUTPATIENT)
Dept: FAMILY MEDICINE | Facility: CLINIC | Age: 50
End: 2025-01-14
Payer: COMMERCIAL

## 2025-01-14 ENCOUNTER — OFFICE VISIT (OUTPATIENT)
Dept: FAMILY MEDICINE | Facility: CLINIC | Age: 50
End: 2025-01-14
Payer: COMMERCIAL

## 2025-01-14 VITALS
SYSTOLIC BLOOD PRESSURE: 134 MMHG | OXYGEN SATURATION: 97 % | DIASTOLIC BLOOD PRESSURE: 80 MMHG | HEART RATE: 73 BPM | HEIGHT: 72 IN | BODY MASS INDEX: 34.9 KG/M2 | WEIGHT: 257.69 LBS | TEMPERATURE: 98 F

## 2025-01-14 DIAGNOSIS — J01.90 ACUTE NON-RECURRENT SINUSITIS, UNSPECIFIED LOCATION: Primary | ICD-10-CM

## 2025-01-14 PROCEDURE — 3075F SYST BP GE 130 - 139MM HG: CPT | Mod: CPTII,S$GLB,, | Performed by: NURSE PRACTITIONER

## 2025-01-14 PROCEDURE — 3008F BODY MASS INDEX DOCD: CPT | Mod: CPTII,S$GLB,, | Performed by: NURSE PRACTITIONER

## 2025-01-14 PROCEDURE — 96372 THER/PROPH/DIAG INJ SC/IM: CPT | Mod: S$GLB,,, | Performed by: NURSE PRACTITIONER

## 2025-01-14 PROCEDURE — 1159F MED LIST DOCD IN RCRD: CPT | Mod: CPTII,S$GLB,, | Performed by: NURSE PRACTITIONER

## 2025-01-14 PROCEDURE — 99999 PR PBB SHADOW E&M-EST. PATIENT-LVL III: CPT | Mod: PBBFAC,,, | Performed by: NURSE PRACTITIONER

## 2025-01-14 PROCEDURE — 99213 OFFICE O/P EST LOW 20 MIN: CPT | Mod: 25,S$GLB,, | Performed by: NURSE PRACTITIONER

## 2025-01-14 PROCEDURE — 3079F DIAST BP 80-89 MM HG: CPT | Mod: CPTII,S$GLB,, | Performed by: NURSE PRACTITIONER

## 2025-01-14 RX ORDER — DEXAMETHASONE SODIUM PHOSPHATE 4 MG/ML
4 INJECTION, SOLUTION INTRA-ARTICULAR; INTRALESIONAL; INTRAMUSCULAR; INTRAVENOUS; SOFT TISSUE
Status: COMPLETED | OUTPATIENT
Start: 2025-01-14 | End: 2025-01-14

## 2025-01-14 RX ADMIN — DEXAMETHASONE SODIUM PHOSPHATE 4 MG: 4 INJECTION, SOLUTION INTRA-ARTICULAR; INTRALESIONAL; INTRAMUSCULAR; INTRAVENOUS; SOFT TISSUE at 10:01

## 2025-01-14 NOTE — PROGRESS NOTES
This dictation has been generated using Modal Fluency Dictation some phonetic errors may occur. Please contact author for clarification if needed.     1. Acute non-recurrent sinusitis, unspecified location    Other orders  -     dexAMETHasone injection 4 mg         Sinusitis continue current therapies injection as above per patient request.    No follow-ups on file.    ________________________________________________________________  ________________________________________________________________      Chief Complaint   Patient presents with    Sinus Problem    Cough     History of present illness    This 49 y.o. presents today for complaint of   Sinus issues.  Onset of symptoms last week.  Notes maxillary sinus pain.  He has tried antihistamines nasal sprays and started Augmentin.  Since starting Augmentin mucus is less yellow.  Denies fever chills body aches malaise.  No nausea vomiting diarrhea.  No chest pain or shortness a breath.      Past Medical History:   Diagnosis Date    Binge eating disorder     Digestive disorder     Hypertension     Hypertriglyceridemia     Other seasonal allergic rhinitis     Rectal bleeding     when he wipes -    Unspecified osteoarthritis, unspecified site        Past Surgical History:   Procedure Laterality Date    CARPAL TUNNEL RELEASE Right     COLONOSCOPY N/A 09/02/2016    Procedure: COLONOSCOPY;  Surgeon: Yosi Bueno MD;  Location: [facility];  Service: Endoscopy;  Laterality: N/A;    COLONOSCOPY N/A 12/29/2022    Procedure: COLONOSCOPY;  Surgeon: Titi Ortega MD;  [facility]: [facility];  Service: Endoscopy;  Laterality: N/A;    LAPAROSCOPIC SLEEVE GASTRECTOMY  06/17/2024    neck injections         Family History   Problem Relation Name Age of Onset    Hypertension Mother      Cancer Father          colon    Hypertension Brother      Gout Brother         Social History     Socioeconomic History    Marital status:    Occupational History    Occupation: [employer]   Tobacco Use    Smoking status: Former     Current packs/day: 1.00     Types: Cigarettes     Passive exposure: Past    Smokeless tobacco: Never   Substance and Sexual Activity    Alcohol use: No    Drug use: Not Currently   Social History Narrative    ** Merged History Encounter **          Social Drivers of Health     Financial Resource Strain: Low Risk  (11/25/2024)    Overall Financial Resource Strain (CARDIA)     Difficulty of Paying Living Expenses: Not very hard   Food Insecurity: No Food Insecurity (11/25/2024)    Hunger Vital Sign     Worried About Running Out of Food in the Last Year: Never true     Ran Out of Food in the Last Year: Never true   Physical Activity: Insufficiently Active (11/25/2024)    Exercise Vital Sign     Days of Exercise per Week: 2 days     Minutes of Exercise per Session: 30 min   Stress: No Stress Concern Present (11/25/2024)    [employer] - Occupational Stress Questionnaire     Feeling of Stress : Not at all   Housing Stability: Unknown (11/25/2024)    Housing Stability Vital Sign     Unable to Pay for Housing in the Last Year: No       Current Outpatient Medications   Medication Sig Dispense Refill    amoxicillin-clavulanate 875-125mg (AUGMENTIN) 875-125 mg per tablet Take 1 tablet by mouth every 12 (twelve) hours. 20 tablet 0    loratadine (CLARITIN) 10 mg tablet Take 10 mg by mouth.      pantoprazole (PROTONIX) 40 MG tablet Take 1 tablet (40 mg total) by mouth once daily. 90 tablet 3    rosuvastatin (CRESTOR) 5 MG tablet Take 1 tablet (5 mg total) by mouth once daily. 90 tablet 3     No current facility-administered medications for this visit.       Review of patient's allergies indicates:  No Known Allergies    Physical examination  Vitals Reviewed  /80 (BP Location: Right arm, Patient Position: Sitting)   Pulse 73   Temp 98.4 °F (36.9 °C) (Oral)   Ht 6' (1.829 m)   Wt 116.9 kg (257 lb 11.2 oz)   SpO2 97%   BMI 34.95 kg/m²   Body mass index is 34.95 kg/m².     BP Readings from Last 3 Encounters:   01/14/25 134/80   11/25/24 124/80   10/28/24 132/74       Wt Readings from Last 3 Encounters:   01/14/25 116.9 kg (257 lb 11.2 oz)   11/25/24 114.8 kg (253 lb)   10/28/24 114.9 kg (253 lb 6.4 oz)       Gen. Well-dressed well-nourished   Skin warm dry and intact.  No rashes noted.  HEENT.  TM intact bilateral with normal light reflex.  No mastoid tenderness during percussion.  Nares patent bilateral.  Pharynx is unremarkable.  ++maxillary sinus tenderness when percussed.    Neck is supple without adenopathy  Chest.  Respirations are even unlabored.  Lungs are clear to auscultation.  Cardiac regular rate and rhythm.  No chest wall adenopathy noted.  Neuro. Awake alert oriented x4.  Normal judgment and cognition noted.  Extremities no clubbing cyanosis or edema noted.     Call or return to clinic prn if these symptoms worsen or fail to improve as anticipated.

## 2025-01-14 NOTE — TELEPHONE ENCOUNTER
Sinus infection, sore throat  Was around the corner, did not want to wait until this afternoon for our clinic, scheduled with Mikey benoit @ [id number] today. Directions given to patient

## 2025-01-14 NOTE — TELEPHONE ENCOUNTER
----- Message from Shavon sent at 1/14/2025  9:18 AM CST -----  Contact: Aneta  Pt needs to be seen asap. Sinus infection, sore throat. Aneta @[phone]

## 2025-01-31 ENCOUNTER — TELEPHONE (OUTPATIENT)
Dept: FAMILY MEDICINE | Facility: CLINIC | Age: 50
End: 2025-01-31
Payer: COMMERCIAL

## 2025-03-24 DIAGNOSIS — K21.9 GASTROESOPHAGEAL REFLUX DISEASE, UNSPECIFIED WHETHER ESOPHAGITIS PRESENT: ICD-10-CM

## 2025-03-24 DIAGNOSIS — E78.5 HYPERLIPIDEMIA, UNSPECIFIED HYPERLIPIDEMIA TYPE: ICD-10-CM

## 2025-03-24 RX ORDER — ROSUVASTATIN CALCIUM 5 MG/1
5 TABLET, COATED ORAL DAILY
Qty: 90 TABLET | Refills: 3 | Status: SHIPPED | OUTPATIENT
Start: 2025-03-24 | End: 2026-03-24

## 2025-03-24 RX ORDER — PANTOPRAZOLE SODIUM 40 MG/1
40 TABLET, DELAYED RELEASE ORAL DAILY
Qty: 90 TABLET | Refills: 3 | Status: SHIPPED | OUTPATIENT
Start: 2025-03-24 | End: 2026-03-24

## 2025-05-27 DIAGNOSIS — E78.5 HYPERLIPIDEMIA, UNSPECIFIED HYPERLIPIDEMIA TYPE: ICD-10-CM

## 2025-05-27 DIAGNOSIS — K21.9 GASTROESOPHAGEAL REFLUX DISEASE, UNSPECIFIED WHETHER ESOPHAGITIS PRESENT: ICD-10-CM

## 2025-05-27 RX ORDER — PANTOPRAZOLE SODIUM 40 MG/1
40 TABLET, DELAYED RELEASE ORAL DAILY
Qty: 90 TABLET | Refills: 3 | Status: SHIPPED | OUTPATIENT
Start: 2025-05-27 | End: 2026-05-27

## 2025-05-27 RX ORDER — ROSUVASTATIN CALCIUM 5 MG/1
5 TABLET, COATED ORAL DAILY
Qty: 90 TABLET | Refills: 3 | Status: SHIPPED | OUTPATIENT
Start: 2025-05-27 | End: 2026-05-27

## 2025-07-30 DIAGNOSIS — K21.9 GASTROESOPHAGEAL REFLUX DISEASE, UNSPECIFIED WHETHER ESOPHAGITIS PRESENT: ICD-10-CM

## 2025-07-30 DIAGNOSIS — E78.5 HYPERLIPIDEMIA, UNSPECIFIED HYPERLIPIDEMIA TYPE: ICD-10-CM

## 2025-07-30 RX ORDER — PANTOPRAZOLE SODIUM 40 MG/1
40 TABLET, DELAYED RELEASE ORAL DAILY
Qty: 90 TABLET | Refills: 3 | Status: SHIPPED | OUTPATIENT
Start: 2025-07-30 | End: 2026-07-30

## 2025-07-30 RX ORDER — ROSUVASTATIN CALCIUM 5 MG/1
5 TABLET, COATED ORAL DAILY
Qty: 90 TABLET | Refills: 3 | Status: SHIPPED | OUTPATIENT
Start: 2025-07-30 | End: 2026-07-30

## 2025-07-30 NOTE — TELEPHONE ENCOUNTER
Lov 11/25/25   Patient is an active individual actively bicycles and drives in the community.  The last few days he has been noticing some dizziness and lightheadedness.  Yesterday he got admitted because of generalized weakness of both legs.  Today after his occupational therapy session initially his orthostatic vitals were normal but then a few minutes later his blood pressure dropped to 70s systolics got dizzy lightheaded diaphoretic and he was laid down and a little while later his blood pressure recovered.  normal cortisol level. decrease metoprolol dose continue to monitor on telemetry and orthostatics are normal today and no symptoms reported today  place MILDRED stockings

## 2025-08-06 ENCOUNTER — TELEPHONE (OUTPATIENT)
Dept: FAMILY MEDICINE | Facility: CLINIC | Age: 50
End: 2025-08-06
Payer: COMMERCIAL

## 2025-08-06 NOTE — TELEPHONE ENCOUNTER
----- Message from Nahed sent at 8/6/2025 12:06 PM CDT -----  Regarding: pts spouse is here  Pts spouse, Aneta,  is here wants appt for him sooner than matts next avail asking for tomorrow am with anyone   needs med refilled can someone speak with her?  thanks kbb